# Patient Record
Sex: FEMALE | Race: BLACK OR AFRICAN AMERICAN | Employment: FULL TIME | URBAN - METROPOLITAN AREA
[De-identification: names, ages, dates, MRNs, and addresses within clinical notes are randomized per-mention and may not be internally consistent; named-entity substitution may affect disease eponyms.]

---

## 2017-06-23 ENCOUNTER — OFFICE VISIT (OUTPATIENT)
Dept: INTERNAL MEDICINE CLINIC | Age: 52
End: 2017-06-23

## 2017-06-23 VITALS
OXYGEN SATURATION: 99 % | SYSTOLIC BLOOD PRESSURE: 126 MMHG | BODY MASS INDEX: 38.48 KG/M2 | HEART RATE: 83 BPM | DIASTOLIC BLOOD PRESSURE: 82 MMHG | TEMPERATURE: 98.1 F | HEIGHT: 63 IN | WEIGHT: 217.2 LBS | RESPIRATION RATE: 18 BRPM

## 2017-06-23 DIAGNOSIS — D50.0 IRON DEFICIENCY ANEMIA DUE TO CHRONIC BLOOD LOSS: ICD-10-CM

## 2017-06-23 DIAGNOSIS — E66.09 NON MORBID OBESITY DUE TO EXCESS CALORIES: ICD-10-CM

## 2017-06-23 DIAGNOSIS — Z23 ENCOUNTER FOR IMMUNIZATION: ICD-10-CM

## 2017-06-23 DIAGNOSIS — Z00.00 GENERAL MEDICAL EXAM: Primary | ICD-10-CM

## 2017-06-23 DIAGNOSIS — Z11.59 NEED FOR HEPATITIS C SCREENING TEST: ICD-10-CM

## 2017-06-23 DIAGNOSIS — R79.89 LOW TSH LEVEL: ICD-10-CM

## 2017-06-23 DIAGNOSIS — Z12.11 ENCOUNTER FOR SCREENING COLONOSCOPY: ICD-10-CM

## 2017-06-23 NOTE — PROGRESS NOTES
Chief Complaint   Patient presents with   [facility].     was a pt if [address] back 2013    Physical       1. Have you been to the ER, urgent care clinic since your last visit? Hospitalized since your last visit? No    2. Have you seen or consulted any other health care providers outside of the [address] [place] [address] since your last visit? Include any pap smears or colon screening.  No

## 2017-06-23 NOTE — PROGRESS NOTES
Ms. Laura Baer is a new patient who is here to establish care. CC:  Establish Care (was a pt if DR Matilde Faith back ) and Physical       HPI:   Iron deficient Anemia ( ferritin 7 in ): at the time attributed to fibroids and heavy menstrual bleeding   Stool occult blood negative in    Attributed to heavy menses and fibroids. Patient stopped menstruating last year then had period last 2017. Had 2 pelvic  US in past year done by gyn -[facility] seen by gynecologist in 2017. Has felt intermittent dizziness X2 about 3-4 weeks ago prompting her to make appointment - concerned anemia could be getting worst   Denies blood in the stool    Obesity: patient admits to not eating healthy diet, drinks soda, not exercising    Colonoscopy never  Mammogram last 2016 and normal at [facility]        Review of systems:  Constitutional: negative for fever, chills, weight loss, night sweats   Eyes : negative for vision changes, eye pain and discharge  Nose and Throat: negative for tinnitus, sore throat   Cardiovascular: negative for chest pain, palpitations and shortness of breath  Respiratory: negative for shortness of breath, cough and wheezing   Gastroinstestinal: negative for abdominal pain, nausea, vomiting, diarrhea, constipation, and blood in the stool  Musculoskeletal: negative for back ache and joint ache   Genitourinary: negative for dysuria, nocturia, polyuria and hematuria   Neurologic: Negative for focal weakness, numbness or incoordination  Skin: negative for rash, pruritus  Hematologic: negative for easy bruising      History reviewed. No pertinent past medical history. Past Surgical History:   Procedure Laterality Date    DELIVERY          Allergies   Allergen Reactions    Shellfish Derived Hives       No current outpatient prescriptions on file prior to visit. No current facility-administered medications on file prior to visit. family history includes Cancer in her maternal aunt; Diabetes in her maternal aunt; Hypertension in her maternal aunt and mother. There is no history of Heart Disease or Stroke. Social History     Social History    Marital status:      Spouse name: N/A    Number of children: N/A    Years of education: N/A     Occupational History    Not on file. Social History Main Topics    Smoking status: Former Smoker     Quit date: 1/1/2000    Smokeless tobacco: Never Used    Alcohol use No    Drug use: No    Sexual activity: Yes     Partners: Male     Other Topics Concern    Not on file     Social History Narrative       Visit Vitals    /82 (BP 1 Location: Right arm, BP Patient Position: Sitting)    Pulse 83    Temp 98.1 °F (36.7 °C) (Oral)    Resp 18    Ht 5' 3\" (1.6 m)    Wt 217 lb 3.2 oz (98.5 kg)    SpO2 99%    BMI 38.48 kg/m2     General:  Well appearing female no acute distress  HEENT:   PERRL,normal conjunctiva. External ear and canals normal, TMs normal.  Hearing normal to voice. Nose without edema or discharge, normal septum. Lips, teeth, gums normal.  Oropharynx: no erythema, no exudates, no lesions, normal tongue. Neck:  Supple. Thyroid normal size, nontender, without nodules. No carotid bruit. No masses or lymphadenopathy  Respiratory: no respiratory distress,  no wheezing, no rhonchi, no rales. No chest wall tenderness. Cardiovascular:  RRR, normal S1S2, no murmur. Gastrointestinal: normal bowel sounds, soft, nontender, without masses. No hepatosplenomegaly. Extremities +2 pulses, no edema, normal sensation   Musculoskeletal:  Normal gait. Normal digits and nails. Normal strength and tone, no atrophy, and no abnormal movement. Skin:  No rash, no lesions, no ulcers. Skin warm, normal turgor, without induration or nodules. Neuro:  A and OX4, fluent speech, cranial nerves normal 2-12. Sensation normal to light touch.   DTR symmetrical  Psych:  Normal affect      Lab Results   Component Value Date/Time    WBC 9.4 01/22/2013 12:02 PM    HGB 10.0 01/22/2013 12:02 PM    HCT 30.8 01/22/2013 12:02 PM    PLATELET 615 58/08/5471 12:02 PM    MCV 82 01/22/2013 12:02 PM     Lab Results   Component Value Date/Time    Sodium 136 01/22/2013 12:02 PM    Potassium 4.0 01/22/2013 12:02 PM    Chloride 103 01/22/2013 12:02 PM    CO2 22 01/22/2013 12:02 PM    Glucose 72 01/22/2013 12:02 PM    BUN 13 01/22/2013 12:02 PM    Creatinine 0.90 01/22/2013 12:02 PM    BUN/Creatinine ratio 14 01/22/2013 12:02 PM    GFR est AA >59 03/23/2011 10:04 AM    GFR est non-AA 76 01/22/2013 12:02 PM    Calcium 9.2 01/22/2013 12:02 PM     Lab Results   Component Value Date/Time    Cholesterol, total 168 01/22/2013 12:02 PM    HDL Cholesterol 39 01/22/2013 12:02 PM    LDL, calculated 111 01/22/2013 12:02 PM    VLDL, calculated 18 01/22/2013 12:02 PM    Triglyceride 89 01/22/2013 12:02 PM     Lab Results   Component Value Date/Time    TSH 1.080 01/22/2013 12:02 PM     No results found for: HBA1C, HGBE8, RPY1YXVI, IZP0CPZM, VGJ0RUEP  No results found for: Jeanne Reyes, XQVID3, XQVID, VD3RIA                Assessment and Plan:   48-year-old woman with a history of iron deficient anemia presenting to establish care    1. Iron deficiency anemia due to heavy menses fibroids  -Had only one menstrual cycle in the past year. It was not heavy  -We will check CBC and iron levels  - FERRITIN  - IRON  Pending results and advise whether patient needs to be on iron  2. Encounter for screening colonoscopy    - REFERRAL FOR COLONOSCOPY    3. Non morbid obesity due to excess calories  -Discussed importance of exercising daily given a handout on healthy diet  - METABOLIC PANEL, COMPREHENSIVE  - CBC WITH AUTOMATED DIFF  - HEMOGLOBIN A1C WITH EAG  - TSH AND FREE T4  - LIPID PANEL    4. Need for hepatitis C screening test  - HEPATITIS C AB      5.  Encounter for immunization  - TETANUS, DIPHTHERIA TOXOIDS AND ACELLULAR PERTUSSIS VACCINE (TDAP), IN INDIVIDS. >=7, IM  - [place] IMMUNIZ ADMIN,1 SINGLE/COMB VAC/TOXOID    Follow-up Disposition:  Return in about 6 months (around 12/23/2017).      MD Jean-Pierre

## 2017-06-23 NOTE — MR AVS SNAPSHOT
Visit Information Date & Time Provider Department Dept. Phone Encounter #  
 6/23/2017 11:00 AM Oneil Morejon [address] [place] [address],[address] [phone] [id number] Follow-up Instructions Return in about 6 months (around 12/23/2017). Upcoming Health Maintenance Date Due Hepatitis C Screening 1965 DTaP/Tdap/Td series (1 - Tdap) 7/12/1985 PAP AKA CERVICAL CYTOLOGY 3/1/2013 BREAST CANCER SCRN MAMMOGRAM 7/12/2014 FOBT Q 1 YEAR AGE 50-75 7/12/2014 INFLUENZA AGE 9 TO ADULT 8/1/2017 Allergies as of 6/23/2017  Review Complete On: 6/23/2017 By: Regis Zambrano Severity Noted Reaction Type Reactions Shellfish Derived  06/23/2017    Hives Current Immunizations  Reviewed on 3/17/2011 No immunizations on file. Not reviewed this visit You Were Diagnosed With   
  
 Codes Comments General medical exam    -  Primary ICD-10-CM: Z00.00 ICD-9-CM: V70.9 Encounter for screening colonoscopy     ICD-10-CM: Z12.11 ICD-9-CM: V76.51 Non morbid obesity due to excess calories     ICD-10-CM: E66.09 
ICD-9-CM: 278.00 Need for hepatitis C screening test     ICD-10-CM: Z11.59 
ICD-9-CM: V73.89 Iron deficiency anemia due to chronic blood loss     ICD-10-CM: D50.0 ICD-9-CM: 280.0 Vitals BP Pulse Temp Resp Height(growth percentile) Weight(growth percentile) 126/82 (BP 1 Location: Right arm, BP Patient Position: Sitting) 83 98.1 °F (36.7 °C) (Oral) 18 5' 3\" (1.6 m) 217 lb 3.2 oz (98.5 kg) SpO2 BMI OB Status Smoking Status 99% 38.48 kg/m2 Menopause Former Smoker BMI and BSA Data Body Mass Index Body Surface Area  
 38.48 kg/m 2 2.09 m 2 Preferred Pharmacy Pharmacy Name Phone [id number], [place] [address] [phone] Your Updated Medication List  
  
Notice  As of 6/23/2017 11:17 AM  
 You have not been prescribed any medications. We Performed the Following CBC WITH AUTOMATED DIFF [71409 CPT(R)] FERRITIN [80549 CPT(R)] HEMOGLOBIN A1C WITH EAG [22149 CPT(R)] HEPATITIS C AB [10830 CPT(R)] IRON [id number] CPT(R)] LIPID PANEL [19848 CPT(R)] METABOLIC PANEL, COMPREHENSIVE [06710 CPT(R)] REFERRAL FOR COLONOSCOPY [WJE210 Custom] TSH AND FREE T4 [75283 CPT(R)] Follow-up Instructions Return in about 6 months (around 12/23/2017). Referral Information Referral ID Referred By Referred To  
  
 [id number] APPA [address] [facility], [facility], [address] [place] [address] [address] , [address] Phone: [phone] Fax: [phone] Visits Status Start Date End Date 1 New Request 6/23/17 6/23/18 If your referral has a status of pending review or denied, additional information will be sent to support the outcome of this decision. Patient Instructions Learning About Low-Carbohydrate Diets for Weight Loss What is a low-carbohydrate diet? Low-carb diets avoid foods that are high in carbohydrate. These high-carb foods include pasta, bread, rice, cereal, fruits, and starchy vegetables. Instead, these diets usually have you eat foods that are high in fat and protein. Many people lose weight quickly on a low-carb diet. But the early weight loss is water. People on this diet often gain the weight back after they start eating carbs again. Not all diet plans are safe or work well. A lot of the evidence shows that low-carb diets aren't healthy. That's because these diets often don't include healthy foods like fruits and vegetables. Losing weight safely means balancing protein, fat, and carbs with every meal and snack. And low-carb diets don't always provide the vitamins, minerals, and fiber you need.  
If you have a serious medical condition, talk to your doctor before you try any diet. These conditions include kidney disease, heart disease, type 2 diabetes, high cholesterol, and high blood pressure. If you are pregnant, it may not be safe for your baby if you are on a low-carb diet. How can you lose weight safely? You might have heard that a diet plan helped another person lose weight. But that doesn't mean that it will work for you. It is very hard to stay on a diet that includes lots of big changes in your eating habits. If you want to get to a healthy weight and stay there, making healthy lifestyle changes will often work better than dieting. These steps can help. · Make a plan for change. Work with your doctor to create a plan that is right for you. · See a dietitian. He or she can show you how to make healthy changes in your eating habits. · Manage stress. If you have a lot of stress in your life, it can be hard to focus on making healthy changes to your daily habits. · Track your food and activity. You are likely to do better at losing weight if you keep track of what you eat and what you do. Follow-up care is a key part of your treatment and safety. Be sure to make and go to all appointments, and call your doctor if you are having problems. It's also a good idea to know your test results and keep a list of the medicines you take. Where can you learn more? Go to http://scott-chris.info/. Enter A121 in the search box to learn more about \"Learning About Low-Carbohydrate Diets for Weight Loss. \" Current as of: December 8, 2016 Content Version: 11.3 © 5320-2757 Healthwise, Incorporated. Care instructions adapted under license by [employer] (which disclaims liability or warranty for this information). If you have questions about a medical condition or this instruction, always ask your healthcare professional. [place] [address] any warranty or liability for your use of this information. Walk 20-30 minutes Introducing [facility] & HEALTH SERVICES! [employer] introduces [employer] patient portal. Now you can access parts of your medical record, email your doctor's office, and request medication refills online. 1. In your internet browser, go to [email or web address]. 2. Click on the First Time User? Click Here link in the Sign In box. You will see the New Member Sign Up page. 3. Enter your [employer] Access Code exactly as it appears below. You will not need to use this code after youve completed the sign-up process. If you do not sign up before the expiration date, you must request a new code. · [employer] Access Code: [email or web address] Expires: 9/21/2017 11:17 AM 
 
4. Enter the last four digits of your Social Security Number (xxxx) and Date of Birth (mm/dd/yyyy) as indicated and click Submit. You will be taken to the next sign-up page. 5. Create a [employer] ID. This will be your [employer] login ID and cannot be changed, so think of one that is secure and easy to remember. 6. Create a [employer] password. You can change your password at any time. 7. Enter your Password Reset Question and Answer. This can be used at a later time if you forget your password. 8. Enter your e-mail address. You will receive e-mail notification when new information is available in [address] 9. Click Sign Up. You can now view and download portions of your medical record. 10. Click the Download Summary menu link to download a portable copy of your medical information. If you have questions, please visit the Frequently Asked Questions section of the [employer] website. Remember, [employer] is NOT to be used for urgent needs. For medical emergencies, dial 911. Now available from your iPhone and Android! Please provide this summary of care documentation to your next provider. Your primary care clinician is listed as Janet TORRES. If you have any questions after today's visit, please call [phone].

## 2017-06-24 LAB
ALBUMIN SERPL-MCNC: 4.3 G/DL (ref 3.5–5.5)
ALBUMIN/GLOB SERPL: 1.5 {RATIO} (ref 1.2–2.2)
ALP SERPL-CCNC: 73 IU/L (ref 39–117)
ALT SERPL-CCNC: 16 IU/L (ref 0–32)
AST SERPL-CCNC: 16 IU/L (ref 0–40)
BASOPHILS # BLD AUTO: 0 X10E3/UL (ref 0–0.2)
BASOPHILS NFR BLD AUTO: 1 %
BILIRUB SERPL-MCNC: 0.3 MG/DL (ref 0–1.2)
BUN SERPL-MCNC: 17 MG/DL (ref 6–24)
BUN/CREAT SERPL: 19 (ref 9–23)
CALCIUM SERPL-MCNC: 9.9 MG/DL (ref 8.7–10.2)
CHLORIDE SERPL-SCNC: 105 MMOL/L (ref 96–106)
CHOLEST SERPL-MCNC: 172 MG/DL (ref 100–199)
CO2 SERPL-SCNC: 24 MMOL/L (ref 18–29)
CREAT SERPL-MCNC: 0.9 MG/DL (ref 0.57–1)
EOSINOPHIL # BLD AUTO: 0.1 X10E3/UL (ref 0–0.4)
EOSINOPHIL NFR BLD AUTO: 2 %
ERYTHROCYTE [DISTWIDTH] IN BLOOD BY AUTOMATED COUNT: 13.5 % (ref 12.3–15.4)
EST. AVERAGE GLUCOSE BLD GHB EST-MCNC: 117 MG/DL
FERRITIN SERPL-MCNC: 82 NG/ML (ref 15–150)
GLOBULIN SER CALC-MCNC: 2.9 G/DL (ref 1.5–4.5)
GLUCOSE SERPL-MCNC: 72 MG/DL (ref 65–99)
HBA1C MFR BLD: 5.7 % (ref 4.8–5.6)
HCT VFR BLD AUTO: 37.6 % (ref 34–46.6)
HCV AB S/CO SERPL IA: <0.1 S/CO RATIO (ref 0–0.9)
HDLC SERPL-MCNC: 35 MG/DL
HGB BLD-MCNC: 12.7 G/DL (ref 11.1–15.9)
IMM GRANULOCYTES # BLD: 0 X10E3/UL (ref 0–0.1)
IMM GRANULOCYTES NFR BLD: 0 %
IRON SERPL-MCNC: 68 UG/DL (ref 27–159)
LDLC SERPL CALC-MCNC: 123 MG/DL (ref 0–99)
LYMPHOCYTES # BLD AUTO: 2.7 X10E3/UL (ref 0.7–3.1)
LYMPHOCYTES NFR BLD AUTO: 44 %
MCH RBC QN AUTO: 30.2 PG (ref 26.6–33)
MCHC RBC AUTO-ENTMCNC: 33.8 G/DL (ref 31.5–35.7)
MCV RBC AUTO: 89 FL (ref 79–97)
MONOCYTES # BLD AUTO: 0.5 X10E3/UL (ref 0.1–0.9)
MONOCYTES NFR BLD AUTO: 8 %
NEUTROPHILS # BLD AUTO: 2.8 X10E3/UL (ref 1.4–7)
NEUTROPHILS NFR BLD AUTO: 45 %
PLATELET # BLD AUTO: 271 X10E3/UL (ref 150–379)
POTASSIUM SERPL-SCNC: 4.7 MMOL/L (ref 3.5–5.2)
PROT SERPL-MCNC: 7.2 G/DL (ref 6–8.5)
RBC # BLD AUTO: 4.21 X10E6/UL (ref 3.77–5.28)
SODIUM SERPL-SCNC: 143 MMOL/L (ref 134–144)
T4 FREE SERPL-MCNC: 1.03 NG/DL (ref 0.82–1.77)
TRIGL SERPL-MCNC: 71 MG/DL (ref 0–149)
TSH SERPL DL<=0.005 MIU/L-ACNC: 0.44 UIU/ML (ref 0.45–4.5)
VLDLC SERPL CALC-MCNC: 14 MG/DL (ref 5–40)
WBC # BLD AUTO: 6.2 X10E3/UL (ref 3.4–10.8)

## 2017-06-27 NOTE — PROGRESS NOTES
Kidney and liver function are normal  Blood count is normal and irons levels are normal - no anemia. 1. Screening for diabetes: shows that you in the pre diabetic range with you sugars. It is important to increase exercise to 30 minutes daily and decrease the amount of carbohydrates ( sugars, bread, pasta, rice, potato) to ensure that you do not develop diabetes. We will repeat this in 3-6 months and if still elevated we can consider starting medication to prevent diabetes - Metformin  Hep C is negative  Thyroid function - TSH is slightly low but very close to normal range - I have a ordered a repeat Thyroid study to be done at your convenience  4. Triglycerides ( short term fat storage) is normal. HDL good cholesterol is low ( 35 goal is above 40)  LDL which is bad cholesterol is mildly elevated recommend increased exercise to 30 minutes daily and increased fiber intake - vegetables, fruits and oats and whole grain. Decrease fatty food intake. We will repeat choletserol level in one year.

## 2018-08-09 ENCOUNTER — OFFICE VISIT (OUTPATIENT)
Dept: INTERNAL MEDICINE CLINIC | Age: 53
End: 2018-08-09

## 2018-08-09 VITALS
HEIGHT: 63 IN | WEIGHT: 212 LBS | TEMPERATURE: 97.8 F | OXYGEN SATURATION: 99 % | BODY MASS INDEX: 37.56 KG/M2 | DIASTOLIC BLOOD PRESSURE: 82 MMHG | RESPIRATION RATE: 18 BRPM | HEART RATE: 69 BPM | SYSTOLIC BLOOD PRESSURE: 119 MMHG

## 2018-08-09 DIAGNOSIS — Z12.39 SCREENING FOR BREAST CANCER: ICD-10-CM

## 2018-08-09 DIAGNOSIS — E78.00 ELEVATED CHOLESTEROL: ICD-10-CM

## 2018-08-09 DIAGNOSIS — Z12.11 SCREENING FOR COLON CANCER: ICD-10-CM

## 2018-08-09 DIAGNOSIS — Z00.00 ANNUAL PHYSICAL EXAM: Primary | ICD-10-CM

## 2018-08-09 DIAGNOSIS — Z86.2 HX OF IRON DEFICIENCY ANEMIA: ICD-10-CM

## 2018-08-09 DIAGNOSIS — R79.89 ABNORMAL TSH: ICD-10-CM

## 2018-08-09 PROBLEM — E66.01 SEVERE OBESITY (BMI 35.0-39.9): Status: ACTIVE | Noted: 2018-08-09

## 2018-08-09 NOTE — PROGRESS NOTES
Reviewed record in preparation for visit and have obtained necessary documentation. Identified pt with two pt identifiers(name and ). Chief Complaint   Patient presents with    Complete Physical       Health Maintenance Due   Topic Date Due    Cervical Cancer Screening  2013    Stool testing for trace blood  2014    Breast Cancer Screening  10/03/2017    Flu Vaccine  2018       Ms. Hollie Julio has a reminder for a \"due or due soon\" health maintenance. I have asked that she discuss this further with her primary care provider for follow-up on this health maintenance. Coordination of Care Questionnaire:  :     1) Have you been to an emergency room, urgent care clinic since your last visit? no   Hospitalized since your last visit? no             2) Have you seen or consulted any other health care providers outside of [address] [place] [address] since your last visit? no  (Include any pap smears or colon screenings in this section.)    3) In the event something were to happen to you and you were unable to speak on your behalf, do you have an Advance Directive/ Living Will in place stating your wishes? YES    Do you have an Advance Directive on file? yes    4) Are you interested in receiving information on Advance Directives? NO    Patient is accompanied by self I have received verbal consent from Danielle Barker to discuss any/all medical information while they are present in the room.

## 2018-08-09 NOTE — MR AVS SNAPSHOT
[address] [facility] 
[phone] Patient: Nyasia Jacome MRN: [id number] :1965 Visit Information Date & Time Provider Department Dept. Phone Encounter #  
 2018  8:30 AM Jose Alberto Srinivasan, [address] [place] [address],[address] [phone] [id number] Follow-up Instructions Return in about 1 year (around 2019) for annual exam. Upcoming Health Maintenance Date Due COLONOSCOPY 1982 PAP AKA CERVICAL CYTOLOGY 3/1/2013 BREAST CANCER SCRN MAMMOGRAM 10/3/2017 Influenza Age 5 to Adult 2018 DTaP/Tdap/Td series (2 - Td) 2027 Allergies as of 2018  Review Complete On: 2018 By: Jose Alberto Srinivasan MD  
  
 Severity Noted Reaction Type Reactions Shellfish Derived  2017    Hives Current Immunizations  Reviewed on 3/17/2011 Name Date Tdap 2017 11:31 AM  
  
 Not reviewed this visit You Were Diagnosed With   
  
 Codes Comments Annual physical exam    -  Primary ICD-10-CM: Z00.00 ICD-9-CM: V70.0 Screening for colon cancer     ICD-10-CM: Z12.11 ICD-9-CM: V76.51 Elevated cholesterol     ICD-10-CM: E78.00 ICD-9-CM: 272.0 Abnormal TSH     ICD-10-CM: R94.6 ICD-9-CM: 790.6 Hx of iron deficiency anemia     ICD-10-CM: Z86.2 ICD-9-CM: V12.3 Screening for breast cancer     ICD-10-CM: Z12.31 
ICD-9-CM: V76.10 Vitals BP Pulse Temp Resp Height(growth percentile) Weight(growth percentile) 119/82 (BP 1 Location: Right arm, BP Patient Position: Sitting) 69 97.8 °F (36.6 °C) (Oral) 18 5' 3\" (1.6 m) 212 lb (96.2 kg) SpO2 BMI OB Status Smoking Status 99% 37.55 kg/m2 Menopause Former Smoker BMI and BSA Data Body Mass Index Body Surface Area  
 37.55 kg/m 2 2.07 m 2 Preferred Pharmacy Pharmacy Name Phone  Evette Parrish [address] [phone] Your Updated Medication List  
  
Notice  As of 8/9/2018  8:58 AM  
 You have not been prescribed any medications. We Performed the Following CBC WITH AUTOMATED DIFF [22606 CPT(R)] LIPID PANEL [17727 CPT(R)] METABOLIC PANEL, COMPREHENSIVE [36343 CPT(R)] REFERRAL FOR COLONOSCOPY [TOY463 Custom] Comments:  
 Screening colonoscopy TSH AND FREE T4 [93223 CPT(R)] Follow-up Instructions Return in about 1 year (around 8/9/2019) for annual exam. To-Do List   
 08/09/2018 Imaging:  [facility] MAMMO BI SCREENING INCL CAD Referral Information Referral ID Referred By Referred To  
  
 [id number] RENEE Vincent MD   
   [address] [place] [address] [address] [facility] [address] Phone: [phone] Fax: [phone] Visits Status Start Date End Date 1 New Request 8/9/18 8/9/19 If your referral has a status of pending review or denied, additional information will be sent to support the outcome of this decision. Patient Instructions Schedule mammogram 
 
Schedule colonoscopy Continue working on weight loss - goal is to loose 1 lb per month Introducing [facility] & HE[facility] SERVICES! Dear Lady Kirkland: Thank you for requesting a [employer] account. Our records indicate that you already have an active [employer] account. You can access your account anytime at [email or web address]. [email or web address] Did you know that you can access your hospital and ER discharge instructions at any time in [employer]? You can also review all of your test results from your hospital stay or ER visit. Additional Information If you have questions, please visit the Frequently Asked Questions section of the [employer] website at [email or web address] Remember, [employer] is NOT to be used for urgent needs. For medical emergencies, dial 911. Now available from your iPhone and Android! Please provide this summary of care documentation to your next provider. Your primary care clinician is listed as MIKE Mazariegos. If you have any questions after today's visit, please call [phone].

## 2018-08-09 NOTE — PATIENT INSTRUCTIONS
Schedule mammogram    Schedule colonoscopy    Continue working on weight loss - goal is to loose 1 lb per month

## 2018-08-09 NOTE — PROGRESS NOTES
Ms. Manish Brown is presenting to follow up on chronic medical issues   CC:  Complete Physical  Obesity  High cholesterol  Hx of iron deficient anemia      HPI:   Iron deficient Anemia ( ferritin 7 in ): at the time attributed to fibroids and heavy menstrual bleeding   Stool occult blood negative in    Attributed to heavy menses and fibroids. Patient stopped menstruating in  and the repeat CBC last year was normal   Had 2 pelvic  US in past year done by gyn -[address] [facility] [address] seen by gynecologist in 2017. Obesity:lost 5 lbs since last year    Colonoscopy never - given referral last year and not done    Mammogram last 2016 and normal at [facility] [place]        Review of systems:  10 systems reviewed and negative other than HPI      History reviewed. No pertinent past medical history. Past Surgical History:   Procedure Laterality Date    DELIVERY          Allergies   Allergen Reactions    Shellfish Derived Hives       No current outpatient prescriptions on file prior to visit. No current facility-administered medications on file prior to visit. family history includes Cancer in her maternal aunt; Diabetes in her maternal aunt; Hypertension in her maternal aunt and mother. There is no history of Heart Disease or Stroke. Social History     Social History    Marital status:      Spouse name: N/A    Number of children: N/A    Years of education: N/A     Occupational History    Not on file.      Social History Main Topics    Smoking status: Former Smoker     Quit date: 2000    Smokeless tobacco: Never Used    Alcohol use No    Drug use: No    Sexual activity: Yes     Partners: Male     Other Topics Concern    Not on file     Social History Narrative       Visit Vitals    /82 (BP 1 Location: Right arm, BP Patient Position: Sitting)    Pulse 69    Temp 97.8 °F (36.6 °C) (Oral)    Resp 18    Ht 5' 3\" (1.6 m)    Wt 212 lb (96.2 kg)    SpO2 99%    BMI 37.55 kg/m2     General:  Well appearing female no acute distress  HEENT:   PERRL,normal conjunctiva. External ear and canals normal, TMs normal.  Hearing normal to voice. Nose without edema or discharge, normal septum. Lips, teeth, gums normal.  Oropharynx: no erythema, no exudates, no lesions, normal tongue. Neck:  Supple. Thyroid normal size, nontender, without nodules. No carotid bruit. No masses or lymphadenopathy  Respiratory: no respiratory distress,  no wheezing, no rhonchi, no rales. No chest wall tenderness. Cardiovascular:  RRR, normal S1S2, no murmur. Gastrointestinal: normal bowel sounds, soft, nontender, without masses. No hepatosplenomegaly. Extremities +2 pulses, no edema, normal sensation   Musculoskeletal:  Normal gait. Normal digits and nails. Normal strength and tone, no atrophy, and no abnormal movement. Skin:  No rash, no lesions, no ulcers. Skin warm, normal turgor, without induration or nodules. Neuro:  A and OX4, fluent speech, cranial nerves normal 2-12. Sensation normal to light touch.   DTR symmetrical  Psych:  Normal affect      Lab Results   Component Value Date/Time    WBC 6.2 06/23/2017 11:47 AM    HGB 12.7 06/23/2017 11:47 AM    HCT 37.6 06/23/2017 11:47 AM    PLATELET 164 81/46/4224 11:47 AM    MCV 89 06/23/2017 11:47 AM     Lab Results   Component Value Date/Time    Sodium 143 06/23/2017 11:47 AM    Potassium 4.7 06/23/2017 11:47 AM    Chloride 105 06/23/2017 11:47 AM    CO2 24 06/23/2017 11:47 AM    Glucose 72 06/23/2017 11:47 AM    BUN 17 06/23/2017 11:47 AM    Creatinine 0.90 06/23/2017 11:47 AM    BUN/Creatinine ratio 19 06/23/2017 11:47 AM    GFR est AA 85 06/23/2017 11:47 AM    GFR est non-AA 74 06/23/2017 11:47 AM    Calcium 9.9 06/23/2017 11:47 AM     Lab Results   Component Value Date/Time    Cholesterol, total 172 06/23/2017 11:47 AM    HDL Cholesterol 35 (L) 06/23/2017 11:47 AM    LDL, calculated 123 (H) 06/23/2017 11:47 AM    VLDL, calculated 14 06/23/2017 11:47 AM    Triglyceride 71 06/23/2017 11:47 AM     Lab Results   Component Value Date/Time    TSH 0.442 (L) 06/23/2017 11:47 AM     Lab Results   Component Value Date/Time    Hemoglobin A1c 5.7 (H) 06/23/2017 11:47 AM     No results found for: Orvis Cancel, XQVID3, XQVID, VD3RIA                Assessment and Plan:   50-year-old woman with a history of iron deficient anemia  Presenting to follow up     Annual physical exam: normal exam    Slightly abnormal TSH last year - repeat TSH this year, asymptomatic    Iron deficiency anemia due to heavy menses fibroids  - since menopause resolved  - check CBC today      Encounter for screening colonoscopy  - REFERRAL FOR COLONOSCOPY     Non morbid obesity due to excess calories  -patient lost 5 lbs in past year   -encouraged to continue working on weight loss goal is 1-2 lbs per month       Ordered screening mammogram  Follow-up Disposition: Not on File     Corinna Schwab MD

## 2018-08-10 LAB
ALBUMIN SERPL-MCNC: 4.4 G/DL (ref 3.5–5.5)
ALBUMIN/GLOB SERPL: 1.6 {RATIO} (ref 1.2–2.2)
ALP SERPL-CCNC: 76 IU/L (ref 39–117)
ALT SERPL-CCNC: 21 IU/L (ref 0–32)
AST SERPL-CCNC: 21 IU/L (ref 0–40)
BASOPHILS # BLD AUTO: 0 X10E3/UL (ref 0–0.2)
BASOPHILS NFR BLD AUTO: 1 %
BILIRUB SERPL-MCNC: 0.3 MG/DL (ref 0–1.2)
BUN SERPL-MCNC: 18 MG/DL (ref 6–24)
BUN/CREAT SERPL: 20 (ref 9–23)
CALCIUM SERPL-MCNC: 9.9 MG/DL (ref 8.7–10.2)
CHLORIDE SERPL-SCNC: 103 MMOL/L (ref 96–106)
CHOLEST SERPL-MCNC: 176 MG/DL (ref 100–199)
CO2 SERPL-SCNC: 23 MMOL/L (ref 20–29)
CREAT SERPL-MCNC: 0.92 MG/DL (ref 0.57–1)
EOSINOPHIL # BLD AUTO: 0.1 X10E3/UL (ref 0–0.4)
EOSINOPHIL NFR BLD AUTO: 1 %
ERYTHROCYTE [DISTWIDTH] IN BLOOD BY AUTOMATED COUNT: 13.3 % (ref 12.3–15.4)
GLOBULIN SER CALC-MCNC: 2.8 G/DL (ref 1.5–4.5)
GLUCOSE SERPL-MCNC: 73 MG/DL (ref 65–99)
HCT VFR BLD AUTO: 38.4 % (ref 34–46.6)
HDLC SERPL-MCNC: 42 MG/DL
HGB BLD-MCNC: 12.6 G/DL (ref 11.1–15.9)
IMM GRANULOCYTES # BLD: 0 X10E3/UL (ref 0–0.1)
IMM GRANULOCYTES NFR BLD: 0 %
LDLC SERPL CALC-MCNC: 124 MG/DL (ref 0–99)
LYMPHOCYTES # BLD AUTO: 3 X10E3/UL (ref 0.7–3.1)
LYMPHOCYTES NFR BLD AUTO: 44 %
MCH RBC QN AUTO: 29.5 PG (ref 26.6–33)
MCHC RBC AUTO-ENTMCNC: 32.8 G/DL (ref 31.5–35.7)
MCV RBC AUTO: 90 FL (ref 79–97)
MONOCYTES # BLD AUTO: 0.5 X10E3/UL (ref 0.1–0.9)
MONOCYTES NFR BLD AUTO: 8 %
NEUTROPHILS # BLD AUTO: 3.2 X10E3/UL (ref 1.4–7)
NEUTROPHILS NFR BLD AUTO: 46 %
PLATELET # BLD AUTO: 258 X10E3/UL (ref 150–379)
POTASSIUM SERPL-SCNC: 4.3 MMOL/L (ref 3.5–5.2)
PROT SERPL-MCNC: 7.2 G/DL (ref 6–8.5)
RBC # BLD AUTO: 4.27 X10E6/UL (ref 3.77–5.28)
SODIUM SERPL-SCNC: 141 MMOL/L (ref 134–144)
T4 FREE SERPL-MCNC: 0.95 NG/DL (ref 0.82–1.77)
TRIGL SERPL-MCNC: 50 MG/DL (ref 0–149)
TSH SERPL DL<=0.005 MIU/L-ACNC: 0.97 UIU/ML (ref 0.45–4.5)
VLDLC SERPL CALC-MCNC: 10 MG/DL (ref 5–40)
WBC # BLD AUTO: 6.9 X10E3/UL (ref 3.4–10.8)

## 2018-08-14 NOTE — PROGRESS NOTES
Normal blood count  Thyroid function normalized   Cholesterol: Triglycerides are normal( short term fat storage), HDL good cholesterol is at goal,  LDL which is bad cholesterol is mildly elevated. Recommend increasing  exercise to 30 minutes daily and increased fiber intake - vegetables, fruits and oats and whole grain. Decreasing fatty food intake. We will repeat cholesterol level in one year.

## 2018-12-27 ENCOUNTER — TELEPHONE (OUTPATIENT)
Dept: INTERNAL MEDICINE CLINIC | Age: 53
End: 2018-12-27

## 2018-12-27 RX ORDER — BENZONATATE 200 MG/1
200 CAPSULE ORAL
Qty: 16 CAP | Refills: 0 | Status: SHIPPED | OUTPATIENT
Start: 2018-12-27 | End: 2019-01-03

## 2018-12-27 NOTE — TELEPHONE ENCOUNTER
MD Anna Marie Monique LPN   Caller: Unspecified (Today,  8:24 AM)             Sounds like a viral infection, given runny nose - takes 1-2 weeks to run its course. Prescribed tessalon pearls to help with cough      Spoke with patient. Two pt identifiers confirmed. Patient advised of the above message from Dr. Akilah Garcia. Pt verbalized understanding of information discussed w/ no further questions at this time.

## 2018-12-27 NOTE — TELEPHONE ENCOUNTER
Spoke with patient. Two pt identifiers confirmed. Patient states that she is coughing and congestion. Patient states that her cough is productive with green phelgm. Patient states that she has a runny nose. Patient states that she has also been achey, but does not have a thermometer so she has not checked her temperature. Patient states that she has been taking OTC mucinex, but is not getting any relief. Advised patient that I will check with Dr. Yosvany Faust to see if she can send something in to her pharmacy or if the patient will need to be seen and I will call her back. Pt verbalized understanding of information discussed w/ no further questions at this time.

## 2018-12-27 NOTE — TELEPHONE ENCOUNTER
Patient is requesting to be seen today, I spoke to [facility] and she told me to send over this request. Symptoms are listed in the Reason box.  Thanks

## 2019-08-09 ENCOUNTER — OFFICE VISIT (OUTPATIENT)
Dept: INTERNAL MEDICINE CLINIC | Age: 54
End: 2019-08-09

## 2019-08-09 VITALS
WEIGHT: 226 LBS | SYSTOLIC BLOOD PRESSURE: 122 MMHG | BODY MASS INDEX: 40.04 KG/M2 | RESPIRATION RATE: 18 BRPM | OXYGEN SATURATION: 99 % | HEART RATE: 77 BPM | DIASTOLIC BLOOD PRESSURE: 82 MMHG | TEMPERATURE: 98.1 F | HEIGHT: 63 IN

## 2019-08-09 DIAGNOSIS — E78.00 HIGH CHOLESTEROL: ICD-10-CM

## 2019-08-09 DIAGNOSIS — R29.818 SUSPECTED SLEEP APNEA: ICD-10-CM

## 2019-08-09 DIAGNOSIS — Z00.00 ANNUAL PHYSICAL EXAM: Primary | ICD-10-CM

## 2019-08-09 DIAGNOSIS — E66.01 CLASS 3 SEVERE OBESITY DUE TO EXCESS CALORIES WITH SERIOUS COMORBIDITY AND BODY MASS INDEX (BMI) OF 40.0 TO 44.9 IN ADULT (HCC): ICD-10-CM

## 2019-08-09 DIAGNOSIS — Z13.1 SCREENING FOR DIABETES MELLITUS: ICD-10-CM

## 2019-08-09 NOTE — PROGRESS NOTES
Reviewed record in preparation for visit and have obtained necessary documentation. Identified pt with two pt identifiers(name and ). Chief Complaint   Patient presents with    Complete Physical    Sleep Problem       Health Maintenance Due   Topic Date Due    Pap Test  2013    Shingles Vaccine (1 of 2) 2014    Mammogram  10/03/2017    Flu Vaccine  2019       Ms. Noni Torre has a reminder for a \"due or due soon\" health maintenance. I have asked that she discuss this further with her primary care provider for follow-up on this health maintenance. Coordination of Care Questionnaire:  :     1) Have you been to an emergency room, urgent care clinic since your last visit? no   Hospitalized since your last visit? no             2) Have you seen or consulted any other health care providers outside of [address] [place] [address] since your last visit? no  (Include any pap smears or colon screenings in this section.)    3) In the event something were to happen to you and you were unable to speak on your behalf, do you have an Advance Directive/ Living Will in place stating your wishes? NO    Do you have an Advance Directive on file? no    4) Are you interested in receiving information on Advance Directives? NO    Patient is accompanied by self I have received verbal consent from Mitch Hurt to discuss any/all medical information while they are present in the room.

## 2019-08-09 NOTE — PROGRESS NOTES
Ms. Rajendra Botello is presenting to follow up on chronic medical issues   CC:  Complete Physical and Sleep Problem  Obesity  High cholesterol  Hx of iron deficient anemia      HPI:  Ms Adolfo Keys is presenting for annual visit    Obesity:she gained 14 lbs since last visit, struggling with eating fast food and sodas. Discussed importance of weight loss     Colonoscopy done in the interval and normal    Mammogram last 2016 and normal at [facility]    Feels fatigued, snoring, non restorative sleep. Concerned with possible sleep apnea    Review of systems:  10 systems reviewed and negative other than HPI      History reviewed. No pertinent past medical history. Past Surgical History:   Procedure Laterality Date    DELIVERY          Allergies   Allergen Reactions    Shellfish Derived Hives       No current outpatient medications on file prior to visit. No current facility-administered medications on file prior to visit. family history includes Cancer in her maternal aunt; Diabetes in her maternal aunt; Hypertension in her maternal aunt and mother.     Social History     Socioeconomic History    Marital status:      Spouse name: Not on file    Number of children: Not on file    Years of education: Not on file    Highest education level: Not on file   Occupational History    Not on file   Social Needs    Financial resource strain: Not on file    Food insecurity:     Worry: Not on file     Inability: Not on file    Transportation needs:     Medical: Not on file     Non-medical: Not on file   Tobacco Use    Smoking status: Former Smoker     Last attempt to quit: 2000     Years since quittin.6    Smokeless tobacco: Never Used   Substance and Sexual Activity    Alcohol use: No    Drug use: No    Sexual activity: Yes     Partners: Male   Lifestyle    Physical activity:     Days per week: Not on file     Minutes per session: Not on file    Stress: Not on file   Relationships    Social connections:     Talks on phone: Not on file     Gets together: Not on file     Attends [employer] service: Not on file     Active member of club or organization: Not on file     Attends meetings of clubs or organizations: Not on file     Relationship status: Not on file    Intimate partner violence:     Fear of current or ex partner: Not on file     Emotionally abused: Not on file     Physically abused: Not on file     Forced sexual activity: Not on file   Other Topics Concern    Not on file   Social History Narrative    Not on file       Visit Vitals  /82 (BP 1 Location: Right arm, BP Patient Position: Sitting)   Pulse 77   Temp 98.1 °F (36.7 °C) (Oral)   Resp 18   Ht 5' 3\" (1.6 m)   Wt 226 lb (102.5 kg)   SpO2 99%   BMI 40.03 kg/m²     General:  Well appearing female no acute distress  HEENT:   PERRL,normal conjunctiva. External ear and canals normal, TMs normal.  Hearing normal to voice. Nose without edema or discharge, normal septum. Lips, teeth, gums normal.  Oropharynx: no erythema, no exudates, no lesions, normal tongue. Neck:  Supple. Thyroid normal size, nontender, without nodules. No carotid bruit. No masses or lymphadenopathy  Respiratory: no respiratory distress,  no wheezing, no rhonchi, no rales. No chest wall tenderness. Cardiovascular:  RRR, normal S1S2, no murmur. Gastrointestinal: normal bowel sounds, soft, nontender, without masses. No hepatosplenomegaly. Extremities +2 pulses, no edema, normal sensation   Musculoskeletal:  Normal gait. Normal digits and nails. Normal strength and tone, no atrophy, and no abnormal movement. Skin:  No rash, no lesions, no ulcers. Skin warm, normal turgor, without induration or nodules. Neuro:  A and OX4, fluent speech, cranial nerves normal 2-12. Sensation normal to light touch.   DTR symmetrical  Psych:  Normal affect      Lab Results   Component Value Date/Time    WBC 6.9 08/09/2018 09:05 AM    HGB 12.6 08/09/2018 09:05 AM    HCT 38.4 08/09/2018 09:05 AM    PLATELET 971 69/45/8595 09:05 AM    MCV 90 08/09/2018 09:05 AM     Lab Results   Component Value Date/Time    Sodium 141 08/09/2018 09:05 AM    Potassium 4.3 08/09/2018 09:05 AM    Chloride 103 08/09/2018 09:05 AM    CO2 23 08/09/2018 09:05 AM    Glucose 73 08/09/2018 09:05 AM    BUN 18 08/09/2018 09:05 AM    Creatinine 0.92 08/09/2018 09:05 AM    BUN/Creatinine ratio 20 08/09/2018 09:05 AM    GFR est AA 82 08/09/2018 09:05 AM    GFR est non-AA 71 08/09/2018 09:05 AM    Calcium 9.9 08/09/2018 09:05 AM     Lab Results   Component Value Date/Time    Cholesterol, total 176 08/09/2018 09:05 AM    HDL Cholesterol 42 08/09/2018 09:05 AM    LDL, calculated 124 (H) 08/09/2018 09:05 AM    VLDL, calculated 10 08/09/2018 09:05 AM    Triglyceride 50 08/09/2018 09:05 AM     Lab Results   Component Value Date/Time    TSH 0.973 08/09/2018 09:05 AM     Lab Results   Component Value Date/Time    Hemoglobin A1c 5.7 (H) 06/23/2017 11:47 AM     No results found for: Marj Ferrell, XQVID3, XQVID, VD3RIA                Assessment and Plan:   62-year-old woman with a history of iron deficient anemia  Presenting to follow up     Annual physical exam: normal exam    Iron deficiency anemia due to heavy menses fibroids  - since menopause resolved  -     Given prescription for annual mammogram     Non morbid obesity due to excess calories  -patient gained 14 lbs   -we discussed the importance of weight loss and healthy eating    Suspected MARIANA: referral to sleep medicine     Ordered screening mammogram       Shweta Albright MD

## 2019-08-09 NOTE — PATIENT INSTRUCTIONS
Schedule pap smear Schedule your mammogram 
Schedule appointment with the nutritionist 
Schedule appointment with sleep doctor Office Policies Phone calls/patient messages: Please allow up to 24 hours for someone in the office to contact you about your call or message. Be mindful your provider may be out of the office or your message may require further review. We encourage you to use [employer] for your messages as this is a faster, more efficient way to communicate with our office Medication Refills: 
         
Prescription medications require 48-72 business hours to process. We encourage you to use [employer] for your refills. For controlled medications: Please allow 72 business hours to process. Certain medications may require you to  a written prescription at our office. NO narcotic/controlled medications will be prescribed after 4pm Monday through Friday or on weekends Form/Paperwork Completion: 
         
Please note a $25 fee may incur for all paperwork for completed by our providers. We ask that you allow 7-10 business days. Pre-payment is due prior to picking up/faxing the completed form. You may also download your forms to [employer] to have your doctor print off.

## 2019-08-10 LAB
ALBUMIN SERPL-MCNC: 4.3 G/DL (ref 3.5–5.5)
ALBUMIN/GLOB SERPL: 1.4 {RATIO} (ref 1.2–2.2)
ALP SERPL-CCNC: 73 IU/L (ref 39–117)
ALT SERPL-CCNC: 13 IU/L (ref 0–32)
AST SERPL-CCNC: 14 IU/L (ref 0–40)
BASOPHILS # BLD AUTO: 0 X10E3/UL (ref 0–0.2)
BASOPHILS NFR BLD AUTO: 1 %
BILIRUB SERPL-MCNC: 0.3 MG/DL (ref 0–1.2)
BUN SERPL-MCNC: 17 MG/DL (ref 6–24)
BUN/CREAT SERPL: 16 (ref 9–23)
CALCIUM SERPL-MCNC: 10.1 MG/DL (ref 8.7–10.2)
CHLORIDE SERPL-SCNC: 106 MMOL/L (ref 96–106)
CHOLEST SERPL-MCNC: 196 MG/DL (ref 100–199)
CO2 SERPL-SCNC: 24 MMOL/L (ref 20–29)
CREAT SERPL-MCNC: 1.09 MG/DL (ref 0.57–1)
EOSINOPHIL # BLD AUTO: 0.1 X10E3/UL (ref 0–0.4)
EOSINOPHIL NFR BLD AUTO: 1 %
ERYTHROCYTE [DISTWIDTH] IN BLOOD BY AUTOMATED COUNT: 12.7 % (ref 12.3–15.4)
GLOBULIN SER CALC-MCNC: 3 G/DL (ref 1.5–4.5)
GLUCOSE SERPL-MCNC: 84 MG/DL (ref 65–99)
HBA1C MFR BLD: 5.6 % (ref 4.8–5.6)
HCT VFR BLD AUTO: 37.9 % (ref 34–46.6)
HDLC SERPL-MCNC: 34 MG/DL
HGB BLD-MCNC: 12.6 G/DL (ref 11.1–15.9)
IMM GRANULOCYTES # BLD AUTO: 0 X10E3/UL (ref 0–0.1)
IMM GRANULOCYTES NFR BLD AUTO: 0 %
LDLC SERPL CALC-MCNC: 148 MG/DL (ref 0–99)
LYMPHOCYTES # BLD AUTO: 2.9 X10E3/UL (ref 0.7–3.1)
LYMPHOCYTES NFR BLD AUTO: 46 %
MCH RBC QN AUTO: 29.4 PG (ref 26.6–33)
MCHC RBC AUTO-ENTMCNC: 33.2 G/DL (ref 31.5–35.7)
MCV RBC AUTO: 88 FL (ref 79–97)
MONOCYTES # BLD AUTO: 0.6 X10E3/UL (ref 0.1–0.9)
MONOCYTES NFR BLD AUTO: 9 %
NEUTROPHILS # BLD AUTO: 2.8 X10E3/UL (ref 1.4–7)
NEUTROPHILS NFR BLD AUTO: 43 %
PLATELET # BLD AUTO: 268 X10E3/UL (ref 150–450)
POTASSIUM SERPL-SCNC: 4.6 MMOL/L (ref 3.5–5.2)
PROT SERPL-MCNC: 7.3 G/DL (ref 6–8.5)
RBC # BLD AUTO: 4.29 X10E6/UL (ref 3.77–5.28)
SODIUM SERPL-SCNC: 143 MMOL/L (ref 134–144)
TRIGL SERPL-MCNC: 70 MG/DL (ref 0–149)
VLDLC SERPL CALC-MCNC: 14 MG/DL (ref 5–40)
WBC # BLD AUTO: 6.4 X10E3/UL (ref 3.4–10.8)

## 2019-08-12 NOTE — PROGRESS NOTES
Cholesterol is high work on low fat diet and exercise  Blood sugar is in normal range  Kidney function is mildly impaired - increase water intake and avoid NSAID use ( motrin, ibuprofen)  Normal kidney and liver function   Normal blood count

## 2019-08-22 ENCOUNTER — HOSPITAL ENCOUNTER (INPATIENT)
Age: 54
LOS: 3 days | Discharge: HOME | DRG: 853 | End: 2019-08-26
Attending: EMERGENCY MEDICINE | Admitting: INTERNAL MEDICINE
Payer: COMMERCIAL

## 2019-08-22 ENCOUNTER — APPOINTMENT (OUTPATIENT)
Dept: GENERAL RADIOLOGY | Age: 54
DRG: 853 | End: 2019-08-22
Attending: EMERGENCY MEDICINE
Payer: COMMERCIAL

## 2019-08-22 ENCOUNTER — APPOINTMENT (OUTPATIENT)
Dept: CT IMAGING | Age: 54
DRG: 853 | End: 2019-08-22
Attending: EMERGENCY MEDICINE
Payer: COMMERCIAL

## 2019-08-22 DIAGNOSIS — R65.21 SEPTIC SHOCK (HCC): Primary | ICD-10-CM

## 2019-08-22 DIAGNOSIS — A41.9 SEPTIC SHOCK (HCC): Primary | ICD-10-CM

## 2019-08-22 LAB
ALBUMIN SERPL-MCNC: 2.9 G/DL (ref 3.5–5)
ALBUMIN/GLOB SERPL: 0.6 {RATIO} (ref 1.1–2.2)
ALP SERPL-CCNC: 367 U/L (ref 45–117)
ALT SERPL-CCNC: 42 U/L (ref 12–78)
ANION GAP SERPL CALC-SCNC: 12 MMOL/L (ref 5–15)
AST SERPL-CCNC: 49 U/L (ref 15–37)
BASOPHILS # BLD: 0 K/UL (ref 0–0.1)
BASOPHILS NFR BLD: 0 % (ref 0–1)
BILIRUB SERPL-MCNC: 2.8 MG/DL (ref 0.2–1)
BUN SERPL-MCNC: 55 MG/DL (ref 6–20)
BUN/CREAT SERPL: 13 (ref 12–20)
CALCIUM SERPL-MCNC: 9.1 MG/DL (ref 8.5–10.1)
CHLORIDE SERPL-SCNC: 106 MMOL/L (ref 97–108)
CO2 SERPL-SCNC: 21 MMOL/L (ref 21–32)
CREAT SERPL-MCNC: 4.32 MG/DL (ref 0.55–1.02)
DIFFERENTIAL METHOD BLD: ABNORMAL
EOSINOPHIL # BLD: 0 K/UL (ref 0–0.4)
EOSINOPHIL NFR BLD: 0 % (ref 0–7)
ERYTHROCYTE [DISTWIDTH] IN BLOOD BY AUTOMATED COUNT: 14.4 % (ref 11.5–14.5)
GLOBULIN SER CALC-MCNC: 4.6 G/DL (ref 2–4)
GLUCOSE SERPL-MCNC: 93 MG/DL (ref 65–100)
HCT VFR BLD AUTO: 36 % (ref 35–47)
HGB BLD-MCNC: 12.1 G/DL (ref 11.5–16)
IMM GRANULOCYTES # BLD AUTO: 0 K/UL (ref 0–0.04)
IMM GRANULOCYTES NFR BLD AUTO: 0 % (ref 0–0.5)
LYMPHOCYTES # BLD: 1 K/UL (ref 0.8–3.5)
LYMPHOCYTES NFR BLD: 24 % (ref 12–49)
MCH RBC QN AUTO: 30 PG (ref 26–34)
MCHC RBC AUTO-ENTMCNC: 33.6 G/DL (ref 30–36.5)
MCV RBC AUTO: 89.3 FL (ref 80–99)
MONOCYTES # BLD: 0.2 K/UL (ref 0–1)
MONOCYTES NFR BLD: 5 % (ref 5–13)
NEUTS SEG # BLD: 2.9 K/UL (ref 1.8–8)
NEUTS SEG NFR BLD: 71 % (ref 32–75)
NRBC # BLD: 0.02 K/UL (ref 0–0.01)
NRBC BLD-RTO: 0.5 PER 100 WBC
PLATELET # BLD AUTO: 247 K/UL (ref 150–400)
PMV BLD AUTO: 13 FL (ref 8.9–12.9)
POTASSIUM SERPL-SCNC: 3.9 MMOL/L (ref 3.5–5.1)
PROT SERPL-MCNC: 7.5 G/DL (ref 6.4–8.2)
RBC # BLD AUTO: 4.03 M/UL (ref 3.8–5.2)
RBC MORPH BLD: ABNORMAL
SODIUM SERPL-SCNC: 139 MMOL/L (ref 136–145)
WBC # BLD AUTO: 4.1 K/UL (ref 3.6–11)

## 2019-08-22 PROCEDURE — 87040 BLOOD CULTURE FOR BACTERIA: CPT

## 2019-08-22 PROCEDURE — 74011250637 HC RX REV CODE- 250/637: Performed by: EMERGENCY MEDICINE

## 2019-08-22 PROCEDURE — 74011000258 HC RX REV CODE- 258: Performed by: EMERGENCY MEDICINE

## 2019-08-22 PROCEDURE — 87077 CULTURE AEROBIC IDENTIFY: CPT

## 2019-08-22 PROCEDURE — 85025 COMPLETE CBC W/AUTO DIFF WBC: CPT

## 2019-08-22 PROCEDURE — 74176 CT ABD & PELVIS W/O CONTRAST: CPT

## 2019-08-22 PROCEDURE — C1751 CATH, INF, PER/CENT/MIDLINE: HCPCS

## 2019-08-22 PROCEDURE — 96365 THER/PROPH/DIAG IV INF INIT: CPT

## 2019-08-22 PROCEDURE — 74011250636 HC RX REV CODE- 250/636: Performed by: EMERGENCY MEDICINE

## 2019-08-22 PROCEDURE — 87186 SC STD MICRODIL/AGAR DIL: CPT

## 2019-08-22 PROCEDURE — 36415 COLL VENOUS BLD VENIPUNCTURE: CPT

## 2019-08-22 PROCEDURE — 99285 EMERGENCY DEPT VISIT HI MDM: CPT

## 2019-08-22 PROCEDURE — 83605 ASSAY OF LACTIC ACID: CPT

## 2019-08-22 PROCEDURE — 96361 HYDRATE IV INFUSION ADD-ON: CPT

## 2019-08-22 PROCEDURE — 80053 COMPREHEN METABOLIC PANEL: CPT

## 2019-08-22 RX ORDER — ACETAMINOPHEN 325 MG/1
650 TABLET ORAL
Status: COMPLETED | OUTPATIENT
Start: 2019-08-22 | End: 2019-08-22

## 2019-08-22 RX ORDER — SODIUM CHLORIDE 9 MG/ML
1000 INJECTION, SOLUTION INTRAVENOUS ONCE
Status: COMPLETED | OUTPATIENT
Start: 2019-08-22 | End: 2019-08-22

## 2019-08-22 RX ORDER — SODIUM CHLORIDE 900 MG/100ML
INJECTION INTRAVENOUS
Status: DISPENSED
Start: 2019-08-22 | End: 2019-08-23

## 2019-08-22 RX ORDER — CEFTRIAXONE 1 G/1
INJECTION, POWDER, FOR SOLUTION INTRAMUSCULAR; INTRAVENOUS
Status: DISPENSED
Start: 2019-08-22 | End: 2019-08-23

## 2019-08-22 RX ADMIN — SODIUM CHLORIDE 500 ML: 900 INJECTION, SOLUTION INTRAVENOUS at 20:13

## 2019-08-22 RX ADMIN — SODIUM CHLORIDE 1000 ML: 900 INJECTION, SOLUTION INTRAVENOUS at 21:30

## 2019-08-22 RX ADMIN — ACETAMINOPHEN 650 MG: 325 TABLET ORAL at 20:13

## 2019-08-22 RX ADMIN — SODIUM CHLORIDE 1000 ML: 900 INJECTION, SOLUTION INTRAVENOUS at 20:45

## 2019-08-22 RX ADMIN — CEFTRIAXONE 1 G: 1 INJECTION, POWDER, FOR SOLUTION INTRAMUSCULAR; INTRAVENOUS at 20:45

## 2019-08-22 RX ADMIN — SODIUM CHLORIDE 1000 ML: 900 INJECTION, SOLUTION INTRAVENOUS at 23:09

## 2019-08-23 ENCOUNTER — APPOINTMENT (OUTPATIENT)
Dept: GENERAL RADIOLOGY | Age: 54
DRG: 853 | End: 2019-08-23
Attending: UROLOGY
Payer: COMMERCIAL

## 2019-08-23 ENCOUNTER — APPOINTMENT (OUTPATIENT)
Dept: INTERVENTIONAL RADIOLOGY/VASCULAR | Age: 54
DRG: 853 | End: 2019-08-23
Attending: INTERNAL MEDICINE
Payer: COMMERCIAL

## 2019-08-23 ENCOUNTER — ANESTHESIA (OUTPATIENT)
Dept: SURGERY | Age: 54
DRG: 853 | End: 2019-08-23
Payer: COMMERCIAL

## 2019-08-23 ENCOUNTER — ANESTHESIA EVENT (OUTPATIENT)
Dept: SURGERY | Age: 54
DRG: 853 | End: 2019-08-23
Payer: COMMERCIAL

## 2019-08-23 ENCOUNTER — APPOINTMENT (OUTPATIENT)
Dept: GENERAL RADIOLOGY | Age: 54
DRG: 853 | End: 2019-08-23
Attending: EMERGENCY MEDICINE
Payer: COMMERCIAL

## 2019-08-23 PROBLEM — A41.9 SEPSIS (HCC): Status: ACTIVE | Noted: 2019-08-23

## 2019-08-23 LAB
ANION GAP SERPL CALC-SCNC: 12 MMOL/L (ref 5–15)
APPEARANCE UR: ABNORMAL
ATRIAL RATE: 108 BPM
BACTERIA URNS QL MICRO: ABNORMAL /HPF
BASOPHILS # BLD: 0 K/UL (ref 0–0.1)
BASOPHILS NFR BLD: 0 % (ref 0–1)
BILIRUB UR QL: NEGATIVE
BUN SERPL-MCNC: 53 MG/DL (ref 6–20)
BUN/CREAT SERPL: 13 (ref 12–20)
CALCIUM SERPL-MCNC: 7.8 MG/DL (ref 8.5–10.1)
CALCULATED P AXIS, ECG09: 65 DEGREES
CALCULATED T AXIS, ECG11: 2 DEGREES
CHLORIDE SERPL-SCNC: 110 MMOL/L (ref 97–108)
CO2 SERPL-SCNC: 18 MMOL/L (ref 21–32)
COLOR UR: ABNORMAL
CREAT SERPL-MCNC: 4.06 MG/DL (ref 0.55–1.02)
DIAGNOSIS, 93000: NORMAL
DIFFERENTIAL METHOD BLD: ABNORMAL
EOSINOPHIL # BLD: 0 K/UL (ref 0–0.4)
EOSINOPHIL NFR BLD: 0 % (ref 0–7)
EPITH CASTS URNS QL MICRO: ABNORMAL /LPF
ERYTHROCYTE [DISTWIDTH] IN BLOOD BY AUTOMATED COUNT: 13.7 % (ref 11.5–14.5)
FLUAV AG NPH QL IA: NEGATIVE
FLUBV AG NOSE QL IA: NEGATIVE
GLUCOSE SERPL-MCNC: 136 MG/DL (ref 65–100)
GLUCOSE UR STRIP.AUTO-MCNC: NEGATIVE MG/DL
HCT VFR BLD AUTO: 29.8 % (ref 35–47)
HGB BLD-MCNC: 9.9 G/DL (ref 11.5–16)
HGB UR QL STRIP: ABNORMAL
IMM GRANULOCYTES # BLD AUTO: 0 K/UL (ref 0–0.04)
IMM GRANULOCYTES NFR BLD AUTO: 0 % (ref 0–0.5)
KETONES UR QL STRIP.AUTO: NEGATIVE MG/DL
LACTATE BLD-SCNC: 3.15 MMOL/L (ref 0.4–2)
LACTATE SERPL-SCNC: 1 MMOL/L (ref 0.4–2)
LACTATE SERPL-SCNC: 2.4 MMOL/L (ref 0.4–2)
LEUKOCYTE ESTERASE UR QL STRIP.AUTO: ABNORMAL
LYMPHOCYTES # BLD: 1 K/UL (ref 0.8–3.5)
LYMPHOCYTES NFR BLD: 4 % (ref 12–49)
MCH RBC QN AUTO: 29.7 PG (ref 26–34)
MCHC RBC AUTO-ENTMCNC: 33.2 G/DL (ref 30–36.5)
MCV RBC AUTO: 89.5 FL (ref 80–99)
METAMYELOCYTES NFR BLD MANUAL: 1 %
MONOCYTES # BLD: 1 K/UL (ref 0–1)
MONOCYTES NFR BLD: 4 % (ref 5–13)
NEUTS BAND NFR BLD MANUAL: 5 %
NEUTS SEG # BLD: 21.9 K/UL (ref 1.8–8)
NEUTS SEG NFR BLD: 86 % (ref 32–75)
NITRITE UR QL STRIP.AUTO: NEGATIVE
NRBC # BLD: 0.02 K/UL (ref 0–0.01)
NRBC BLD-RTO: 0.1 PER 100 WBC
P-R INTERVAL, ECG05: 152 MS
PH UR STRIP: 6 [PH] (ref 5–8)
PLATELET # BLD AUTO: 94 K/UL (ref 150–400)
PMV BLD AUTO: 11.1 FL (ref 8.9–12.9)
POTASSIUM SERPL-SCNC: 3.9 MMOL/L (ref 3.5–5.1)
PROT UR STRIP-MCNC: 30 MG/DL
Q-T INTERVAL, ECG07: 334 MS
QRS DURATION, ECG06: 76 MS
QTC CALCULATION (BEZET), ECG08: 447 MS
RBC # BLD AUTO: 3.33 M/UL (ref 3.8–5.2)
RBC #/AREA URNS HPF: ABNORMAL /HPF (ref 0–5)
RBC MORPH BLD: ABNORMAL
SODIUM SERPL-SCNC: 140 MMOL/L (ref 136–145)
SP GR UR REFRACTOMETRY: <1.005 (ref 1–1.03)
TROPONIN I SERPL-MCNC: <0.05 NG/ML
UA: UC IF INDICATED,UAUC: ABNORMAL
UROBILINOGEN UR QL STRIP.AUTO: 0.2 EU/DL (ref 0.2–1)
VENTRICULAR RATE, ECG03: 108 BPM
WBC # BLD AUTO: 24.1 K/UL (ref 3.6–11)
WBC URNS QL MICRO: >100 /HPF (ref 0–4)

## 2019-08-23 PROCEDURE — 87077 CULTURE AEROBIC IDENTIFY: CPT

## 2019-08-23 PROCEDURE — 74011250636 HC RX REV CODE- 250/636: Performed by: INTERNAL MEDICINE

## 2019-08-23 PROCEDURE — 74011250636 HC RX REV CODE- 250/636: Performed by: UROLOGY

## 2019-08-23 PROCEDURE — 74011000258 HC RX REV CODE- 258: Performed by: INTERNAL MEDICINE

## 2019-08-23 PROCEDURE — 85025 COMPLETE CBC W/AUTO DIFF WBC: CPT

## 2019-08-23 PROCEDURE — 65270000029 HC RM PRIVATE

## 2019-08-23 PROCEDURE — 0T768DZ DILATION OF RIGHT URETER WITH INTRALUMINAL DEVICE, VIA NATURAL OR ARTIFICIAL OPENING ENDOSCOPIC: ICD-10-PCS | Performed by: UROLOGY

## 2019-08-23 PROCEDURE — 74420 UROGRAPHY RTRGR +-KUB: CPT

## 2019-08-23 PROCEDURE — 02HV33Z INSERTION OF INFUSION DEVICE INTO SUPERIOR VENA CAVA, PERCUTANEOUS APPROACH: ICD-10-PCS | Performed by: EMERGENCY MEDICINE

## 2019-08-23 PROCEDURE — 36415 COLL VENOUS BLD VENIPUNCTURE: CPT

## 2019-08-23 PROCEDURE — C1769 GUIDE WIRE: HCPCS | Performed by: UROLOGY

## 2019-08-23 PROCEDURE — 87804 INFLUENZA ASSAY W/OPTIC: CPT

## 2019-08-23 PROCEDURE — 76210000006 HC OR PH I REC 0.5 TO 1 HR: Performed by: UROLOGY

## 2019-08-23 PROCEDURE — 74011000250 HC RX REV CODE- 250: Performed by: NURSE ANESTHETIST, CERTIFIED REGISTERED

## 2019-08-23 PROCEDURE — C2617 STENT, NON-COR, TEM W/O DEL: HCPCS | Performed by: UROLOGY

## 2019-08-23 PROCEDURE — 74011250637 HC RX REV CODE- 250/637: Performed by: INTERNAL MEDICINE

## 2019-08-23 PROCEDURE — 77030019927 HC TBNG IRR CYSTO BAXT -A: Performed by: UROLOGY

## 2019-08-23 PROCEDURE — 76060000032 HC ANESTHESIA 0.5 TO 1 HR: Performed by: UROLOGY

## 2019-08-23 PROCEDURE — 80048 BASIC METABOLIC PNL TOTAL CA: CPT

## 2019-08-23 PROCEDURE — 74011000250 HC RX REV CODE- 250: Performed by: EMERGENCY MEDICINE

## 2019-08-23 PROCEDURE — 83605 ASSAY OF LACTIC ACID: CPT

## 2019-08-23 PROCEDURE — 74011000258 HC RX REV CODE- 258: Performed by: NURSE ANESTHETIST, CERTIFIED REGISTERED

## 2019-08-23 PROCEDURE — 93005 ELECTROCARDIOGRAM TRACING: CPT

## 2019-08-23 PROCEDURE — 74011250636 HC RX REV CODE- 250/636: Performed by: NURSE ANESTHETIST, CERTIFIED REGISTERED

## 2019-08-23 PROCEDURE — 87086 URINE CULTURE/COLONY COUNT: CPT

## 2019-08-23 PROCEDURE — C1758 CATHETER, URETERAL: HCPCS | Performed by: UROLOGY

## 2019-08-23 PROCEDURE — 75810000455 HC PLCMT CENT VENOUS CATH LVL 2 5182

## 2019-08-23 PROCEDURE — 76010000138 HC OR TIME 0.5 TO 1 HR: Performed by: UROLOGY

## 2019-08-23 PROCEDURE — 71045 X-RAY EXAM CHEST 1 VIEW: CPT

## 2019-08-23 PROCEDURE — 81001 URINALYSIS AUTO W/SCOPE: CPT

## 2019-08-23 PROCEDURE — 77030018832 HC SOL IRR H20 ICUM -A: Performed by: UROLOGY

## 2019-08-23 PROCEDURE — 84484 ASSAY OF TROPONIN QUANT: CPT

## 2019-08-23 PROCEDURE — 87186 SC STD MICRODIL/AGAR DIL: CPT

## 2019-08-23 PROCEDURE — 74011250637 HC RX REV CODE- 250/637: Performed by: UROLOGY

## 2019-08-23 DEVICE — URETERAL STENT
Type: IMPLANTABLE DEVICE | Site: URETER | Status: FUNCTIONAL
Brand: POLARIS™ ULTRA

## 2019-08-23 RX ORDER — MORPHINE SULFATE 10 MG/ML
2 INJECTION, SOLUTION INTRAMUSCULAR; INTRAVENOUS
Status: DISCONTINUED | OUTPATIENT
Start: 2019-08-23 | End: 2019-08-23 | Stop reason: HOSPADM

## 2019-08-23 RX ORDER — SODIUM CHLORIDE 0.9 % (FLUSH) 0.9 %
5-40 SYRINGE (ML) INJECTION AS NEEDED
Status: DISCONTINUED | OUTPATIENT
Start: 2019-08-23 | End: 2019-08-23 | Stop reason: HOSPADM

## 2019-08-23 RX ORDER — VANCOMYCIN HYDROCHLORIDE
1250
Status: DISCONTINUED | OUTPATIENT
Start: 2019-08-25 | End: 2019-08-24

## 2019-08-23 RX ORDER — FENTANYL CITRATE 50 UG/ML
100 INJECTION, SOLUTION INTRAMUSCULAR; INTRAVENOUS
Status: DISCONTINUED | OUTPATIENT
Start: 2019-08-23 | End: 2019-08-23

## 2019-08-23 RX ORDER — SODIUM CHLORIDE 0.9 % (FLUSH) 0.9 %
5-40 SYRINGE (ML) INJECTION EVERY 8 HOURS
Status: DISCONTINUED | OUTPATIENT
Start: 2019-08-23 | End: 2019-08-26 | Stop reason: HOSPADM

## 2019-08-23 RX ORDER — PROPOFOL 10 MG/ML
INJECTION, EMULSION INTRAVENOUS
Status: DISCONTINUED | OUTPATIENT
Start: 2019-08-23 | End: 2019-08-23 | Stop reason: HOSPADM

## 2019-08-23 RX ORDER — HYDROMORPHONE HYDROCHLORIDE 1 MG/ML
.2-.5 INJECTION, SOLUTION INTRAMUSCULAR; INTRAVENOUS; SUBCUTANEOUS
Status: DISCONTINUED | OUTPATIENT
Start: 2019-08-23 | End: 2019-08-23 | Stop reason: HOSPADM

## 2019-08-23 RX ORDER — MIDAZOLAM HYDROCHLORIDE 1 MG/ML
INJECTION, SOLUTION INTRAMUSCULAR; INTRAVENOUS AS NEEDED
Status: DISCONTINUED | OUTPATIENT
Start: 2019-08-23 | End: 2019-08-23 | Stop reason: HOSPADM

## 2019-08-23 RX ORDER — ONDANSETRON 2 MG/ML
4 INJECTION INTRAMUSCULAR; INTRAVENOUS
Status: DISCONTINUED | OUTPATIENT
Start: 2019-08-23 | End: 2019-08-26 | Stop reason: HOSPADM

## 2019-08-23 RX ORDER — SODIUM CHLORIDE 0.9 % (FLUSH) 0.9 %
5-40 SYRINGE (ML) INJECTION EVERY 8 HOURS
Status: DISCONTINUED | OUTPATIENT
Start: 2019-08-23 | End: 2019-08-23 | Stop reason: HOSPADM

## 2019-08-23 RX ORDER — SODIUM CHLORIDE 9 MG/ML
50 INJECTION, SOLUTION INTRAVENOUS CONTINUOUS
Status: DISCONTINUED | OUTPATIENT
Start: 2019-08-23 | End: 2019-08-26 | Stop reason: HOSPADM

## 2019-08-23 RX ORDER — DIPHENHYDRAMINE HYDROCHLORIDE 50 MG/ML
12.5 INJECTION, SOLUTION INTRAMUSCULAR; INTRAVENOUS AS NEEDED
Status: DISCONTINUED | OUTPATIENT
Start: 2019-08-23 | End: 2019-08-23 | Stop reason: HOSPADM

## 2019-08-23 RX ORDER — FENTANYL CITRATE 50 UG/ML
25 INJECTION, SOLUTION INTRAMUSCULAR; INTRAVENOUS
Status: DISCONTINUED | OUTPATIENT
Start: 2019-08-23 | End: 2019-08-23 | Stop reason: HOSPADM

## 2019-08-23 RX ORDER — HEPARIN SODIUM 200 [USP'U]/100ML
INJECTION, SOLUTION INTRAVENOUS
Status: DISPENSED
Start: 2019-08-23 | End: 2019-08-23

## 2019-08-23 RX ORDER — LIDOCAINE HYDROCHLORIDE 20 MG/ML
20 INJECTION, SOLUTION INFILTRATION; PERINEURAL ONCE
Status: DISPENSED | OUTPATIENT
Start: 2019-08-23 | End: 2019-08-23

## 2019-08-23 RX ORDER — ONDANSETRON 2 MG/ML
INJECTION INTRAMUSCULAR; INTRAVENOUS AS NEEDED
Status: DISCONTINUED | OUTPATIENT
Start: 2019-08-23 | End: 2019-08-23 | Stop reason: HOSPADM

## 2019-08-23 RX ORDER — DEXAMETHASONE SODIUM PHOSPHATE 4 MG/ML
INJECTION, SOLUTION INTRA-ARTICULAR; INTRALESIONAL; INTRAMUSCULAR; INTRAVENOUS; SOFT TISSUE AS NEEDED
Status: DISCONTINUED | OUTPATIENT
Start: 2019-08-23 | End: 2019-08-23 | Stop reason: HOSPADM

## 2019-08-23 RX ORDER — HEPARIN SODIUM 200 [USP'U]/100ML
400 INJECTION, SOLUTION INTRAVENOUS ONCE
Status: DISPENSED | OUTPATIENT
Start: 2019-08-23 | End: 2019-08-23

## 2019-08-23 RX ORDER — PHENYLEPHRINE HCL IN 0.9% NACL 0.4MG/10ML
SYRINGE (ML) INTRAVENOUS AS NEEDED
Status: DISCONTINUED | OUTPATIENT
Start: 2019-08-23 | End: 2019-08-23 | Stop reason: HOSPADM

## 2019-08-23 RX ORDER — HEPARIN SODIUM 5000 [USP'U]/ML
7500 INJECTION, SOLUTION INTRAVENOUS; SUBCUTANEOUS EVERY 8 HOURS
Status: DISCONTINUED | OUTPATIENT
Start: 2019-08-23 | End: 2019-08-24

## 2019-08-23 RX ORDER — ACETAMINOPHEN 325 MG/1
650 TABLET ORAL
Status: DISCONTINUED | OUTPATIENT
Start: 2019-08-23 | End: 2019-08-26 | Stop reason: HOSPADM

## 2019-08-23 RX ORDER — VANCOMYCIN 2 GRAM/500 ML IN 0.9 % SODIUM CHLORIDE INTRAVENOUS
2000 ONCE
Status: COMPLETED | OUTPATIENT
Start: 2019-08-23 | End: 2019-08-23

## 2019-08-23 RX ORDER — SODIUM CHLORIDE 0.9 % (FLUSH) 0.9 %
5-40 SYRINGE (ML) INJECTION AS NEEDED
Status: DISCONTINUED | OUTPATIENT
Start: 2019-08-23 | End: 2019-08-26 | Stop reason: HOSPADM

## 2019-08-23 RX ORDER — PROPOFOL 10 MG/ML
INJECTION, EMULSION INTRAVENOUS AS NEEDED
Status: DISCONTINUED | OUTPATIENT
Start: 2019-08-23 | End: 2019-08-23 | Stop reason: HOSPADM

## 2019-08-23 RX ORDER — NOREPINEPHRINE BITARTRATE/D5W 8 MG/250ML
2-100 PLASTIC BAG, INJECTION (ML) INTRAVENOUS
Status: DISCONTINUED | OUTPATIENT
Start: 2019-08-23 | End: 2019-08-26 | Stop reason: HOSPADM

## 2019-08-23 RX ORDER — ONDANSETRON 2 MG/ML
4 INJECTION INTRAMUSCULAR; INTRAVENOUS AS NEEDED
Status: DISCONTINUED | OUTPATIENT
Start: 2019-08-23 | End: 2019-08-23 | Stop reason: HOSPADM

## 2019-08-23 RX ORDER — SODIUM CHLORIDE, SODIUM LACTATE, POTASSIUM CHLORIDE, CALCIUM CHLORIDE 600; 310; 30; 20 MG/100ML; MG/100ML; MG/100ML; MG/100ML
25 INJECTION, SOLUTION INTRAVENOUS CONTINUOUS
Status: DISCONTINUED | OUTPATIENT
Start: 2019-08-23 | End: 2019-08-23 | Stop reason: HOSPADM

## 2019-08-23 RX ORDER — FENTANYL CITRATE 50 UG/ML
INJECTION, SOLUTION INTRAMUSCULAR; INTRAVENOUS AS NEEDED
Status: DISCONTINUED | OUTPATIENT
Start: 2019-08-23 | End: 2019-08-23 | Stop reason: HOSPADM

## 2019-08-23 RX ADMIN — PROPOFOL 20 MG: 10 INJECTION, EMULSION INTRAVENOUS at 02:13

## 2019-08-23 RX ADMIN — Medication 120 MCG: at 02:18

## 2019-08-23 RX ADMIN — PIPERACILLIN SODIUM,TAZOBACTAM SODIUM 3.38 G: 3; .375 INJECTION, POWDER, FOR SOLUTION INTRAVENOUS at 14:03

## 2019-08-23 RX ADMIN — NOREPINEPHRINE BITARTRATE 4 MCG/MIN: 1 INJECTION INTRAVENOUS at 01:53

## 2019-08-23 RX ADMIN — HEPARIN SODIUM 7500 UNITS: 5000 INJECTION INTRAVENOUS; SUBCUTANEOUS at 14:03

## 2019-08-23 RX ADMIN — PIPERACILLIN SODIUM,TAZOBACTAM SODIUM 3.38 G: 3; .375 INJECTION, POWDER, FOR SOLUTION INTRAVENOUS at 01:14

## 2019-08-23 RX ADMIN — HEPARIN SODIUM 7500 UNITS: 5000 INJECTION INTRAVENOUS; SUBCUTANEOUS at 05:56

## 2019-08-23 RX ADMIN — Medication 10 ML: at 05:51

## 2019-08-23 RX ADMIN — Medication 10 ML: at 21:51

## 2019-08-23 RX ADMIN — FENTANYL CITRATE 12.5 MCG: 50 INJECTION, SOLUTION INTRAMUSCULAR; INTRAVENOUS at 02:13

## 2019-08-23 RX ADMIN — ACETAMINOPHEN 650 MG: 325 TABLET ORAL at 10:17

## 2019-08-23 RX ADMIN — Medication 120 MCG: at 02:09

## 2019-08-23 RX ADMIN — SODIUM CHLORIDE 100 ML/HR: 900 INJECTION, SOLUTION INTRAVENOUS at 03:07

## 2019-08-23 RX ADMIN — Medication 1 AMPULE: at 10:05

## 2019-08-23 RX ADMIN — Medication 10 ML: at 14:05

## 2019-08-23 RX ADMIN — SODIUM CHLORIDE 100 ML/HR: 900 INJECTION, SOLUTION INTRAVENOUS at 01:04

## 2019-08-23 RX ADMIN — PROPOFOL 100 MCG/KG/MIN: 10 INJECTION, EMULSION INTRAVENOUS at 01:58

## 2019-08-23 RX ADMIN — PROPOFOL 30 MG: 10 INJECTION, EMULSION INTRAVENOUS at 02:08

## 2019-08-23 RX ADMIN — MIDAZOLAM HYDROCHLORIDE 1 MG: 1 INJECTION INTRAMUSCULAR; INTRAVENOUS at 01:53

## 2019-08-23 RX ADMIN — Medication 4 MCG/MIN: at 00:43

## 2019-08-23 RX ADMIN — PROPOFOL 20 MG: 10 INJECTION, EMULSION INTRAVENOUS at 01:59

## 2019-08-23 RX ADMIN — HEPARIN SODIUM 7500 UNITS: 5000 INJECTION INTRAVENOUS; SUBCUTANEOUS at 21:52

## 2019-08-23 RX ADMIN — VANCOMYCIN HYDROCHLORIDE 2000 MG: 10 INJECTION, POWDER, LYOPHILIZED, FOR SOLUTION INTRAVENOUS at 05:44

## 2019-08-23 RX ADMIN — ONDANSETRON HYDROCHLORIDE 4 MG: 2 INJECTION, SOLUTION INTRAMUSCULAR; INTRAVENOUS at 02:08

## 2019-08-23 RX ADMIN — Medication 120 MCG: at 02:13

## 2019-08-23 RX ADMIN — DEXAMETHASONE SODIUM PHOSPHATE 4 MG: 4 INJECTION, SOLUTION INTRAMUSCULAR; INTRAVENOUS at 01:59

## 2019-08-23 RX ADMIN — FENTANYL CITRATE 12.5 MCG: 50 INJECTION, SOLUTION INTRAMUSCULAR; INTRAVENOUS at 02:06

## 2019-08-23 NOTE — PROGRESS NOTES
Received a consult. 5 mm right proximal ureteral stone with septic shock. Reviewed labs and imaging and spoke to Dr. Jimmie Biswas and Dr. Eboni Crane. Discussed that her presentation including labs and imaging not completely suggestive of septic shock from right kidney. They are in agreement but no other cause of shock identified and it seems shock emanating from right kidney. No concern for cardiogenic shock or alternate explanation. Of note she did have fever at presentation but did not complain of much pain and her WBC counts normal without left shift. After discussing the ER and admitting hospitalist team, it was decided to proceed with drainage of right kidney as no other explanation. They are also getting EKG and troponins. No concern for PE. I called IR and spoke to Dr. Stephenie Hernandes - kidney malrotated and he felt placement of PCN will be very challenging. I called OR and posted her for emergent right stent placement. They are calling in on-call team and I explained we need to do this asap. In the meantime, ER is continuing fluid resuscitation and starting pressors.

## 2019-08-23 NOTE — ED NOTES
Pts blood pressure continues to drop, Pt is now symptomatic complaining of weakness and reporting Judith Jim feels weird. \" Dr. Kevin Baker made aware. Will continue to assess and monitor.

## 2019-08-23 NOTE — ED NOTES
Spoke with MD and primary RN in regards to patient's elevated POC lactic. Pt now meets \"time zero\" for severe sepsis criteria. Antibiotics and proper fluid bolus ordered. Code purple sheet placed onto patient's chart.

## 2019-08-23 NOTE — PROGRESS NOTES
Pharmacy Automatic Renal Dosing Protocol - Antimicrobials    Indication for Antimicrobials: UTI, sepsis of known etiology    Current Regimen of Each Antimicrobial:  Zosyn 2.25 g IV every 8 hrs (start , day 1)  Vancomycin 2000 mg IV ONCE + 1250 mg IV every 48 hrs (start , day 1)    Previous Antimicrobial Therapy:  Ceftriaxone 1 g IV ONCE ()    Goal Level: Vancomycin Trough: 15 - 20 mcg/mL  (AUC: 400 - 600 mg/hr/Liter/day)     Date Dose & Interval Measured (mcg/mL) Extrapolated (mcg/mL)                       Date & time of next level: after first maintenance dose to be given     Significant Cultures:    blood - pending    Radiology / Imaging results: (X-ray, CT scan or MRI):    CT ABD - 1. A 5 mm calculus in the proximal right ureter results in moderate right  hydronephrosis and perinephric stranding. There are also nonobstructing  bilateral renal calculi. 2. Fibroid uterus. Paralysis, amputations, malnutrition: none noted    Labs:  Recent Labs     19  1950   CREA 4.32*   BUN 55*   WBC 4.1     Temp (24hrs), Av °F (38.3 °C), Min:99.5 °F (37.5 °C), Max:102.4 °F (39.1 °C)    Creatinine Clearance (mL/min) or Dialysis: 12.2 ml/min    Impression/Plan:   Adjusted zosyn to 3.375 g IV every 12 hrs per renal dosing protocol. Ordered vancomycin 2000 mg IV LOAD + 1250 mg IV every 48 hours for an anticipated trough of 16.1 mcg/ml  Antimicrobial stop date 5 days for zosyn. TBD for vanc. Bmp ordered  Monitor SCr. May improve and vanc dose will need to be adjusted. Pharmacy will follow daily and adjust medications as appropriate for renal function and/or serum levels. Thank you,  Paulina Jacobson, CORNELIUSD    Recommended duration of therapy  [email or web address]/Pharmacy/[email or web address] [email or web address]    Renal Dosing  [email or web address]/Pharmacy/Clinical%20Companion/[email or web address] pdf

## 2019-08-23 NOTE — ED PROVIDER NOTES
EMERGENCY DEPARTMENT HISTORY AND PHYSICAL EXAM      Date: 2019  Patient Name: Rachele Solis  Patient Age and Sex: 53 y.o. female    History of Presenting Illness     Chief Complaint   Patient presents with    Referral / Consult     reports that she started with Abd pain on Monday, which was followed by diarrhea on Tuesday, was seen at [facility] with initial BP of 80/60, upon EMS arrival, /70, received 700 mL fluids       History Provided By: Patient    HPI: Rachele Solis, 53 y.o. female c/o fever, chills, diarrhea for the past 4 days. She had some mild right-sided abdominal pain the other day, which resolved. Currently no abdominal pain or nausea or vomiting. She went to urgent care center was noted to be hypotensive and tachycardic, sent to the emergency department. She denies any flank pain or dysuria. No rashes. She has no respiratory symptoms, no chest pain or shortness of breath or cough. No headache or neck pain. Location: Fever and chills, diarrhea  Quality:    Moderate  Severity: Moderate  Duration: 4 days  Timing:    Constant, worsening  Context: None  Modifying factors: None  Associated symptoms: Diarrhea, no shortness of breath or cough    Pt denies any other alleviating or exacerbating factors. There are no other complaints, changes or physical findings at this time. No past medical history on file. Past Surgical History:   Procedure Laterality Date    DELIVERY   12       PCP: Lily Hough MD    Past History   Past Medical History:  No past medical history on file.     Past Surgical History:  Past Surgical History:   Procedure Laterality Date    DELIVERY   12       Family History:  Family History   Problem Relation Age of Onset    Hypertension Mother         ESRD due HTN and proteinuria    Cancer Maternal Aunt         breast and lung    Diabetes Maternal Aunt     Hypertension Maternal Aunt     Heart Disease Neg Hx     Stroke Neg Hx Social History:  Social History     Tobacco Use    Smoking status: Former Smoker     Last attempt to quit: 2000     Years since quittin.6    Smokeless tobacco: Never Used   Substance Use Topics    Alcohol use: No    Drug use: No       Allergies: Allergies   Allergen Reactions    Shellfish Derived Hives       Current Medications:  No current facility-administered medications on file prior to encounter. No current outpatient medications on file prior to encounter. Review of Systems   Review of Systems   Constitutional: Positive for chills, fatigue and fever. Respiratory: Negative for cough and shortness of breath. Gastrointestinal: Positive for abdominal pain ( Mild right-sided pain which resolved) and diarrhea. All other systems reviewed and are negative. Physical Exam   Vital Signs  Patient Vitals for the past 24 hrs:   Temp Pulse Resp BP SpO2   19 0022  (!) 113 25 (!) 83/52 97 %   19 2330  (!) 116 29 (!) 79/52 97 %   19 2240  (!) 112 22 (!) 72/46 97 %   19 2233  (!) 110 28 (!) 75/46 96 %   19 2214  (!) 113 (!) 33 (!) 79/57 95 %   19 2200  (!) 110 (!) 31 (!) 85/53 98 %   19 2130 99.5 °F (37.5 °C) (!) 113 24 98/62 96 %   19 2100  (!) 116 30 91/56 95 %   19 2030  (!) 117 25 105/62 98 %   19 2022    98/70    19 1935 (!) 102.4 °F (39.1 °C) (!) 115 18 119/58 97 %       Physical Exam   Constitutional: She is oriented to person, place, and time. She appears well-developed and well-nourished. No distress. Warm to touch   HENT:   Head: Normocephalic and atraumatic. Eyes: Conjunctivae are normal. Right eye exhibits no discharge. Left eye exhibits no discharge. Neck: Normal range of motion. Neck supple. Cardiovascular: Normal rate, regular rhythm and normal heart sounds. No murmur heard. Pulmonary/Chest: Effort normal and breath sounds normal. No respiratory distress. She has no wheezes. Abdominal: Soft. She exhibits no distension. There is no tenderness. There is no rigidity, no guarding, no CVA tenderness, no tenderness at McBurney's point and negative Meyer's sign. Musculoskeletal: Normal range of motion. She exhibits no deformity. Neurological: She is alert and oriented to person, place, and time. Skin: Skin is warm and dry. No rash noted. There is pallor. Psychiatric: She has a normal mood and affect. Her behavior is normal. Thought content normal.   Nursing note and vitals reviewed. Diagnostic Study Results   Labs  Recent Results (from the past 24 hour(s))   CBC WITH AUTOMATED DIFF    Collection Time: 08/22/19  7:50 PM   Result Value Ref Range    WBC 4.1 3.6 - 11.0 K/uL    RBC 4.03 3.80 - 5.20 M/uL    HGB 12.1 11.5 - 16.0 g/dL    HCT 36.0 35.0 - 47.0 %    MCV 89.3 80.0 - 99.0 FL    MCH 30.0 26.0 - 34.0 PG    MCHC 33.6 30.0 - 36.5 g/dL    RDW 14.4 11.5 - 14.5 %    PLATELET 749 067 - 033 K/uL    MPV 13.0 (H) 8.9 - 12.9 FL    NRBC 0.5 (H) 0  WBC    ABSOLUTE NRBC 0.02 (H) 0.00 - 0.01 K/uL    NEUTROPHILS 71 32 - 75 %    LYMPHOCYTES 24 12 - 49 %    MONOCYTES 5 5 - 13 %    EOSINOPHILS 0 0 - 7 %    BASOPHILS 0 0 - 1 %    IMMATURE GRANULOCYTES 0 0.0 - 0.5 %    ABS. NEUTROPHILS 2.9 1.8 - 8.0 K/UL    ABS. LYMPHOCYTES 1.0 0.8 - 3.5 K/UL    ABS. MONOCYTES 0.2 0.0 - 1.0 K/UL    ABS. EOSINOPHILS 0.0 0.0 - 0.4 K/UL    ABS. BASOPHILS 0.0 0.0 - 0.1 K/UL    ABS. IMM.  GRANS. 0.0 0.00 - 0.04 K/UL    DF MANUAL      RBC COMMENTS JOHN CELLS  PRESENT       METABOLIC PANEL, COMPREHENSIVE    Collection Time: 08/22/19  7:50 PM   Result Value Ref Range    Sodium 139 136 - 145 mmol/L    Potassium 3.9 3.5 - 5.1 mmol/L    Chloride 106 97 - 108 mmol/L    CO2 21 21 - 32 mmol/L    Anion gap 12 5 - 15 mmol/L    Glucose 93 65 - 100 mg/dL    BUN 55 (H) 6 - 20 MG/DL    Creatinine 4.32 (H) 0.55 - 1.02 MG/DL    BUN/Creatinine ratio 13 12 - 20      GFR est AA 13 (L) >60 ml/min/1.73m2    GFR est non-AA 11 (L) >60 ml/min/1.73m2    Calcium 9.1 8.5 - 10.1 MG/DL    Bilirubin, total 2.8 (H) 0.2 - 1.0 MG/DL    ALT (SGPT) 42 12 - 78 U/L    AST (SGOT) 49 (H) 15 - 37 U/L    Alk. phosphatase 367 (H) 45 - 117 U/L    Protein, total 7.5 6.4 - 8.2 g/dL    Albumin 2.9 (L) 3.5 - 5.0 g/dL    Globulin 4.6 (H) 2.0 - 4.0 g/dL    A-G Ratio 0.6 (L) 1.1 - 2.2     LACTIC ACID    Collection Time: 08/22/19 11:42 PM   Result Value Ref Range    Lactic acid 2.4 (HH) 0.4 - 2.0 MMOL/L       Radiologic Studies  CT ABD PELV WO CONT   Final Result   IMPRESSION:    1. A 5 mm calculus in the proximal right ureter results in moderate right   hydronephrosis and perinephric stranding. There are also nonobstructing   bilateral renal calculi. 2. Fibroid uterus. XR CHEST PA LAT    (Results Pending)   IR NEPHROSTOMY PERC RT PLC CATH  SI    (Results Pending)   XR CHEST PORT    (Results Pending)     CT Results  (Last 48 hours)               08/22/19 2253  CT ABD PELV WO CONT Final result    Impression:  IMPRESSION:    1. A 5 mm calculus in the proximal right ureter results in moderate right   hydronephrosis and perinephric stranding. There are also nonobstructing   bilateral renal calculi. 2. Fibroid uterus. Narrative:  EXAM:  CT abdomen pelvis without contrast       INDICATION: Abdominal pain. Fever. COMPARISON: None. TECHNIQUE: Helical CT of the abdomen  and pelvis  without contrast. Coronal and   sagittal reformats are performed. CT dose reduction was achieved through use of   a standardized protocol tailored for this examination and automatic exposure   control for dose modulation. FINDINGS:    Solid organ evaluation is limited without contrast.        The visualized lung bases demonstrate no mass or consolidation. The heart size   is normal. There is no pericardial or pleural effusion.        There is a 5 mm calculus in the proximal right ureter resulting in moderate   right hydronephrosis and perinephric stranding. There are additional   nonobstructing bilateral renal calculi measuring 1 to 3 mm. There is no left   hydronephrosis. A lesion at the lower pole of the left kidney measures 2.5 x 2.6   cm with a Hounsfield unit of 18-20 suggesting a cyst.       The liver and spleen are unremarkable. There are gallstones without biliary duct   dilatation. The pancreas and adrenal glands are unremarkable. There are no dilated bowel loops. The appendix is normal.         There are no enlarged lymph nodes. There is no free fluid or free air. The   aorta tapers without aneurysm. The urinary bladder is unremarkable. Several pelvic phleboliths are noted. There   are uterine fibroids measuring up to 3.5 x 4.2 cm. There is no adnexal mass. The bony structures are age-appropriate.                CXR Results  (Last 48 hours)    None          Procedures     CRITICAL CARE (ASAP ONLY)  Performed by: Frieda Mejias MD  Authorized by: Frieda Mejias MD     Critical care provider statement:     Critical care time (minutes):  65    Critical care time was exclusive of:  Separately billable procedures and treating other patients    Critical care was necessary to treat or prevent imminent or life-threatening deterioration of the following conditions:  Circulatory failure, shock and sepsis    Critical care was time spent personally by me on the following activities:  Development of treatment plan with patient or surrogate, discussions with consultants, examination of patient, ordering and review of laboratory studies, ordering and review of radiographic studies, ordering and performing treatments and interventions, pulse oximetry, re-evaluation of patient's condition and review of old charts  Central Line  Date/Time: 8/23/2019 12:31 AM  Performed by: Frieda Mejias MD  Authorized by: Frieda Mejias MD     Consent:     Consent obtained:  Verbal    Consent given by:  Patient    Risks discussed: Arterial puncture, bleeding, incorrect placement and infection    Alternatives discussed:  Delayed treatment  Pre-procedure details:     Hand hygiene: Hand hygiene performed prior to insertion      Sterile barrier technique: All elements of maximal sterile technique followed      Skin preparation:  ChloraPrep    Skin preparation agent: Skin preparation agent completely dried prior to procedure    Anesthesia (see MAR for exact dosages): Anesthesia method:  Local infiltration    Local anesthetic:  Lidocaine 1% w/o epi  Procedure details:     Location:  R internal jugular    Patient position:  Trendelenburg    Procedural supplies:  Triple lumen    Landmarks identified: yes      Ultrasound guidance: yes      Sterile ultrasound techniques: Sterile gel and sterile probe covers were used      Number of attempts:  1    Successful placement: yes    Post-procedure details:     Post-procedure:  Dressing applied    Assessment:  Blood return through all ports and free fluid flow    Patient tolerance of procedure: Tolerated well, no immediate complications        Medical Decision Making     Provider Notes (Medical Decision Making):   13-year-old female with septic shock, unclear source, no significant respiratory or intra-abdominal symptoms. There is no evidence of cardiogenic shock or massive pulmonary embolism. CT scan demonstrates a 5 mm stone on the right with mild/moderate hydronephrosis. This is presumably the cause of sepsis (urosepsis) as there is no other identifiable infectious source. Patient was treated with fluids, ceftriaxone initially. Worsened blood pressure, central line was placed and patient started on norepinephrine. Antibiotic coverage was broadened to Zosyn and vancomycin. Urology was consulted. They also spoke with IR about the possibility of percutaneous nephrostomy. Ultimately it was decided to take patient for ureteral stent. . Currently patient is starting norepinephrine infusion, chest x-ray pending, and admit to MICU, urology coming shortly to take patient to the OR. Juan M Anne MD  12:29 AM        Consult required? Urology for urgent ureteral stent      Medications Administered During ED Course:  Medications   sodium chloride 0.9 % bolus infusion 1,000 mL (0 mL IntraVENous IV Completed 8/23/19 0022)     Followed by   sodium chloride 0.9 % bolus infusion 1,000 mL (0 mL IntraVENous IV Completed 8/23/19 0022)     Followed by   sodium chloride 0.9 % bolus infusion 1,000 mL (0 mL IntraVENous Held 8/22/19 2137)     Followed by   sodium chloride 0.9 % bolus infusion 90 mL (has no administration in time range)   cefTRIAXone (ROCEPHIN) 1 gram injection (has no administration in time range)   0.9% sodium chloride (MBP/ADV) infusion (has no administration in time range)   NOREPINephrine (LEVOPHED) 8 mg in 5% dextrose 250mL infusion (has no administration in time range)   heparinized saline 2 units/mL infusion 800 Units (has no administration in time range)   lidocaine (XYLOCAINE) 20 mg/mL (2 %) injection 400 mg (has no administration in time range)   iopamidol (ISOVUE-370) 76 % injection 100 mL (has no administration in time range)   piperacillin-tazobactam (ZOSYN) 2.25 g in 0.9% sodium chloride (MBP/ADV) 50 mL (has no administration in time range)   0.9% sodium chloride infusion (has no administration in time range)   sodium chloride 0.9 % bolus infusion 500 mL (0 mL IntraVENous IV Completed 8/22/19 2239)   acetaminophen (TYLENOL) tablet 650 mg (650 mg Oral Given 8/22/19 2013)   0.9% sodium chloride infusion 1,000 mL (0 mL IntraVENous IV Completed 8/22/19 2200)   cefTRIAXone (ROCEPHIN) 1 g in 0.9% sodium chloride (MBP/ADV) 50 mL MBP (0 g IntraVENous IV Completed 8/22/19 2239)          Diagnosis     Disposition:  Admitted    Clinical Impression:   1.  Septic shock ([facility] [address]        Attestation:  I personally performed the services described in this documentation on this date 8/22/2019 for patient Brenna Smith. Alem Bennett MD        I was the first provider for this patient on this visit. To the best of my ability I reviewed relevant prior medical records, electrocardiograms, laboratories, and radiologic studies. The patient's presenting problems were discussed, and the patient was in agreement with the care plan formulated and outlined with them. Please note that this dictation was completed with Dragon voice recognition software. Quite often unanticipated grammatical, syntax, homophones, and other interpretive errors are inadvertently transcribed by the computer software. Please disregard these errors and excuse any errors that have escaped final proofreading.

## 2019-08-23 NOTE — PROGRESS NOTES
5794  Patient admitted from PACU.  VSS-see flow. Patient drowsy but easily aroused and appropriate. Patient denies pain. 0500  Daughter came to bedside to see patient. Oriented to unit and plan of care. 3968  Report to Cynthia Tiwari RN.     Arnel Clinton RN, BSN, CCM

## 2019-08-23 NOTE — PROGRESS NOTES
Bedside shift change report given to Sharyle Bruin (oncoming nurse) by Refugio Tiwari RN (offgoing nurse). Report included the following information SBAR.     0715: AM assessment complete. 1200: Reassessment complete. 1455: Orders received from Dr. Sary Obregon for pt to keep barr in place and to removed central line. 1418: Central line removed. Pressure held for 5 minutes with no signs of bleeding or hematoma. Pressure dsg applied.      1440: Report called to Renan Boykin

## 2019-08-23 NOTE — CONSULTS
Consultation Note    NAME: Cheryl Meza   :  1965   MRN:  [id number]     Date/Time:  2019 8:41 AM    I have been asked to see this patient by Dr. Tod Reinoso  for advice/opinion re: Bertha Brooks. Assessment :    Plan:  HAL  Mild non-AG metabolic acidosis  Anemia/thrombocytopenia  Sepsis  Hypotension  Mom on HD for 20 yrs from HTN    5 mm calculus in the proximal right ureter results in moderate right hydronephrosis and perinephric stranding Creatinine 1.1  and normal previous to that, 4.3 yesterday and 4.1 today    Non-oliguric, but no postobstructive diuresis    Prerenal azotemia + ATN; a small contribution from obstructive uropathy    Will need to trend creatinine, uop and check a urine sediment after resolution of the acute event. Just off levo    S/p cystoscopy, right ureteral stent     Continue with generous hydration       Subjective:   CHIEF COMPLAINT:  hal    HISTORY OF PRESENT ILLNESS:     Nikita Smith is a 53 y.o.   female who has a history of no medical issues. She saw her PCP a few weeks ago and her creatinine was up a smidge and she was told to increase hydration (she is not a great hydrator). No nsaids. No htn, dm. Her mother has been on HD for 20 years (in [place], [place] and protein in the urine). She developed right sided abdominal pain. Some n/v. + generalized weakness associated with fever and chills. Feeling better. Just off levo and on NS at 100. History reviewed. No pertinent past medical history.    Past Surgical History:   Procedure Laterality Date    DELIVERY   12     Social History     Tobacco Use    Smoking status: Former Smoker     Last attempt to quit: 2000     Years since quittin.6    Smokeless tobacco: Never Used   Substance Use Topics    Alcohol use: No      Family History   Problem Relation Age of Onset    Hypertension Mother         ESRD due HTN and proteinuria    Cancer Maternal Aunt         breast and lung    Diabetes Maternal Aunt     Hypertension Maternal Aunt     Heart Disease Neg Hx     Stroke Neg Hx       Allergies   Allergen Reactions    Shellfish Derived Hives      Prior to Admission medications    Not on File     REVIEW OF SYSTEMS:     []  Unable to obtain reliable ROS due to  [] mental status  [] sedated   [] intubated   [x] Total of 12 systems reviewed as follows:  Constitutional: + fever, + chills, negative weight loss  Eyes:   negative visual changes  ENT:   negative sore throat, tongue or lip swelling  Respiratory:  negative cough, negative dyspnea  Cards:  negative for chest pain, palpitations, lower extremity edema  GI:   + for nausea, vomiting and abdominal pain  :  negative for frequency, dysuria  Integument:  negative for rash and pruritus  Heme:  negative for easy bruising and gum/nose bleeding  Musculoskel: negative for myalgias,  back pain; + generalized muscle weakness  Neuro:  negative for headaches, dizziness, vertigo  Psych:  negative for feelings of anxiety, depression   Travel?: none    Objective:   VITALS:    Visit Vitals  /68   Pulse 86   Temp 98.4 °F (36.9 °C)   Resp 24   Ht 5' 3\" (1.6 m)   Wt 103 kg (227 lb)   SpO2 97%   BMI 40.21 kg/m²     PHYSICAL EXAM:  Gen:  []  WD []  WN  [] cachectic []  thin [x]  obese []  disheveled             []  ill apearing  []   Critical  []   Chronic    [x]  No acute distress    HEENT:   [x] NC/AT/PERRLA/EOMI    [] pink conjunctivae      [] pale conjunctivae                  PERRL  [] yes  [] no      [] moist mucosa    [] dry mucosa    hearing intact to voice [x] yes  [] No                 NECK:   supple [x] yes  [] no        masses [] yes  [] No               thyroid  []  non tender  []  tender    RESP:   [x] CTA bilaterally/no wheezing/rhonchi/rales/crackles    [] rhonchi bilaterally - no dullness  [] wheezing   [] rhonchi   [] crackles     use of accessory muscles [] yes [x] no    CARD:   [x]  regular rate and rhythm/No murmurs/rubs/gallops    murmur  [] yes ()  [] no Rubs  [] yes  [] no       Gallops [] yes  [] no    Rate []  regular  []  irregular        carotid bruits  [] Right  []  Left                 LE edema [] yes  [x] no           JVP  []  yes   []  no    ABD:    [x] soft/non distended/non tender/+bowel sounds/no HSM    []  Rigid    tenderness [] yes [] no   Liver enlargement  []  yes []  no                Spleen enlargement  []  yes []  no     distended []  yes [] no     bowel sound  [] hypoactive   [] hyperactive    LYMPH:    Neck []  yes [x]  no       Axillae []  yes []  no    SKIN:   Rashes []  yes   [x]  no    Ulcers []  yes   []  no               [] tight to palpitation    skin turgor []  good  [] poor  [] decreased               Cyanosis/clubbing []  yes []  no    NEUR:   [x] cranial nerves II-XII grossly intact       [] Cranial nerves deficit                 []  facial droop    []  slurred speech   [] aphasic     [] Strength normal     []  weakness  []  LUE  []   RUE/ []  LLE  []   RLE    follows commands  []  yes []  no           PSYCH:   insight [] poor [x] good   Alert and Oriented to  [x] person  [x] place  [x]  time                    [] depressed [] anxious [] agitated  [] lethargic [] stuporous  [] sedated     LAB DATA REVIEWED:    Recent Labs     08/23/19 0353 08/22/19 1950   WBC 24.1* 4.1   HGB 9.9* 12.1   HCT 29.8* 36.0   PLT 94* 247     Recent Labs     08/23/19 0353 08/22/19 1950    139   K 3.9 3.9   * 106   CO2 18* 21   BUN 53* 55*   CREA 4.06* 4.32*   * 93   CA 7.8* 9.1     Recent Labs     08/22/19 1950   SGOT 49*   ALT 42   *   TBILI 2.8*   ALB 2.9*   GLOB 4.6*     No results for input(s): INR, PTP, APTT in the last 72 hours. No lab exists for component: INREXT   No results for input(s): FE, TIBC, PSAT, FERR in the last 72 hours. No results for input(s): PH, PCO2, PO2 in the last 72 hours. No results for input(s): CPK, CKMB in the last 72 hours.     No lab exists for component: TROPONINI  No results found for: Parveen Kang    Procedures: see electronic medical records for all procedures/Xrays and details which were not copied into this note but were reviewed prior to creation of Plan.    ________________________________________________________________________       ___________________________________________________  Consulting Physician:  Blanca Rubalcava MD

## 2019-08-23 NOTE — ED NOTES
TRANSFER - OUT REPORT:    Verbal report given to Nell Cornelius RN(name) on Estefania Reese  being transferred to OR(unit) for ordered procedure       Report consisted of patients Situation, Background, Assessment and   Recommendations(SBAR). Information from the following report(s) SBAR, ED Summary, [facility] and Recent Results was reviewed with the receiving nurse. Lines:   Triple Lumen 08/23/19 Right Neck (Active)   Central Line Being Utilized Yes 8/23/2019 12:23 AM   Site Assessment Clean, dry, & intact 8/23/2019 12:23 AM   Infiltration Assessment 0 8/23/2019 12:23 AM   Dressing Status Clean, dry, & intact 8/23/2019 12:23 AM   Dressing Type Transparent 8/23/2019 12:23 AM   Positive Blood Return (Medial Site) Yes 8/23/2019 12:23 AM   Positive Blood Return (Lateral Site) Yes 8/23/2019 12:23 AM   Positive Blood Return (Site #3) Yes 8/23/2019 12:23 AM       Peripheral IV 08/22/19 Left Hand (Active)   Site Assessment Clean, dry, & intact 8/22/2019  8:28 PM   Phlebitis Assessment 0 8/22/2019  8:28 PM   Infiltration Assessment 0 8/22/2019  8:28 PM   Dressing Status Clean, dry, & intact 8/22/2019  8:28 PM   Dressing Type Transparent 8/22/2019  8:28 PM   Hub Color/Line Status Pink 8/22/2019  8:28 PM       Peripheral IV 08/22/19 Right Antecubital (Active)   Site Assessment Clean, dry, & intact 8/22/2019  8:28 PM   Phlebitis Assessment 0 8/22/2019  8:28 PM   Infiltration Assessment 0 8/22/2019  8:28 PM   Dressing Status Clean, dry, & intact 8/22/2019  8:28 PM   Dressing Type Transparent 8/22/2019  8:28 PM   Hub Color/Line Status Pink 8/22/2019  8:28 PM        Opportunity for questions and clarification was provided.

## 2019-08-23 NOTE — ED NOTES
Dr. José Miguel Jimenez aware of Pts low blood pressure,pt is not symptomatic at this time, Dr. Susy Knox at bedside. No orders received. Will continue ot assess and monitor.

## 2019-08-23 NOTE — ANESTHESIA PREPROCEDURE EVALUATION
Relevant Problems   No relevant active problems       Anesthetic History   No history of anesthetic complications            Review of Systems / Medical History  Patient summary reviewed, nursing notes reviewed and pertinent labs reviewed    Pulmonary  Within defined limits                 Neuro/Psych   Within defined limits           Cardiovascular  Within defined limits                Exercise tolerance: >4 METS     GI/Hepatic/Renal         Renal disease: stones       Endo/Other        Morbid obesity     Other Findings   Comments: Sepsis           Physical Exam    Airway  Mallampati: III  TM Distance: 4 - 6 cm  Neck ROM: normal range of motion   Mouth opening: Normal     Cardiovascular  Regular rate and rhythm,  S1 and S2 normal,  no murmur, click, rub, or gallop             Dental  No notable dental hx       Pulmonary  Breath sounds clear to auscultation               Abdominal  GI exam deferred       Other Findings            Anesthetic Plan    ASA: 3, emergent  Anesthesia type: general            Anesthetic plan and risks discussed with: Patient and Family      MAC or LMA

## 2019-08-23 NOTE — PROGRESS NOTES
[facility] Consult Service Progress NOTE  Pulmonary, Critical Care, and Sleep Medicine    Name: Dorothea Nova MRN: [id number]   : 1965 Hospital: [facility]   Date: 2019  Admission Date: 2019     Chart and notes reviewed. Data reviewed. Pt seen on rounds earlier today. I have evaluated and examined the patient. Pt is unstable and acutely ill in the CCU. Patient was re-evaluated multiple times with repeated discussions with CCU/ER team throughout the day    Hospital Day: 2    I was asked by Francie Tilley MD to see Dorothea Nova a 53 y.o.  female  in consultation for a chief complaint of septic shock      Excerpts from admission notes as follows:     Bernardino Franco, 53 y.o. female c/o fever, chills, diarrhea for the past 4 days. She had some mild right-sided abdominal pain the other day, which resolved. Currently no abdominal pain or nausea or vomiting. She went to urgent care center was noted to be hypotensive and tachycardic, sent to the emergency department. She denies any flank pain or dysuria. No rashes. She has no respiratory symptoms, no chest pain or shortness of breath or cough. No headache or neck pain. Also had generalized weakness, right flank pain starting 3 days ago. CT abdomen was done and show right proximal ureteral stone with hydronephrosis. \" reports that she started with Abd pain on Monday, which was followed by diarrhea on Tuesday, was seen at [facility] with initial BP of 80/60, upon EMS arrival, /70, received 700 mL fluids \"    Pt now in CCU on IV levophed. Pt taken to OR by Dr. Chloe Caballero, Urology for cystoscopy, right ureteral stent. Isac pus seen with hydronephrotic drip. Pt now on ABx. Still on IV levophed. No SOB. No nausea. IMPRESSION:   1. Sepsis with septic shock   2. Pyelonephritis secondary to kidney stone  3. Right ureteral stone with moderate right hydronephrosis   4.  S/p cystoscopy, right ureteral stent 8/23  5. Acute renal failure most likely secondary to prerenal and obstructive uropathy  6. Elevated LFT most likely reactive   7. remote former smoker  8. Uterine fibroids  9. Multiorgan dysfunction as outlined above: Pt has one or more acute or chronic illnesses with severe exacerbation with progression or side effects of treatment that poses a threat to life or bodily function  10. Additional workup outlined below  11. Pt is at high risk of sudden decline and decompensation with life threatening consequenses and continued end organ dysfunction and failure  12. Pt is critically ill. Time spent with pt and staff actively rendering care, managing pt and coordinating care as stated below;  35 minutes, exclusive of any procedures      RECOMMENDATIONS/PLAN:   1. CCU monitoring  2. Agree with Empiric IV antibiotics pending culture results   3. Follow culture results  4. IV vasopressors for circulatory shock refractory to fluids to maintain MAP > 65  5. Transfuse prn to maintain Hgb > 7  6. Labs to follow electrolytes, renal function and and blood counts  7. Bronchial hygiene with respiratory therapy techniques, bronchodilators  8. Pt needs IV fluids with additives and Drug therapy requiring intensive monitoring for toxicity  9. Prescription drug management with home med reconciliation reviewed  10. DVT, SUP prophylaxis  11.  Will be available to assist in medical management while in the CCU pending disposition     [x] High complexity decision making was performed  [x] See my orders for details    [facility] reviewed and pertinent medications noted or modified as needed   Current Facility-Administered Medications   Medication    NOREPINephrine (LEVOPHED) 8 mg in 5% dextrose 250mL infusion    heparinized saline 2 units/mL infusion 800 Units    lidocaine (XYLOCAINE) 20 mg/mL (2 %) injection 400 mg    iopamidol (ISOVUE-370) 76 % injection 100 mL    0.9% sodium chloride infusion    piperacillin-tazobactam (ZOSYN) 3.375 g in 0.9% sodium chloride (MBP/ADV) 100 mL    heparinized saline 2 units/mL 1,000 unit/500 mL infusion    [START ON 2019] vancomycin (VANCOCIN) 1250 mg in  ml infusion    sodium chloride (NS) flush 5-40 mL    sodium chloride (NS) flush 5-40 mL    acetaminophen (TYLENOL) tablet 650 mg    ondansetron (ZOFRAN) injection 4 mg    heparin (porcine) injection 7,500 Units    alcohol 62% (NOZIN) nasal  1 Ampule    sodium chloride 0.9 % bolus infusion 1,000 mL    Followed by   Arvil Fitch sodium chloride 0.9 % bolus infusion 90 mL    cefTRIAXone (ROCEPHIN) 1 gram injection    0.9% sodium chloride (MBP/ADV) infusion      PMH:  has no past medical history of Abuse, Anemia NEC, Arrhythmia, Arthritis, Asthma, Autoimmune disease ([address] CAD (coronary artery disease), Calculus of kidney, Cancer ([address] Chronic kidney disease, Chronic pain, Congestive heart failure, unspecified, Contact dermatitis and other eczema, due to unspecified cause, COPD, Depression, Diabetes ([address] GERD (gastroesophageal reflux disease), Headache(784.0), Hypercholesterolemia, Hypertension, Liver disease, Psychotic disorder ([address] PUD (peptic ulcer disease), Seizures ([address] Stroke ([address] Thromboembolus ([address] Thyroid disease, or Trauma. PSH:   has a past surgical history that includes delivery  (). FHX: family history includes Cancer in her maternal aunt; Diabetes in her maternal aunt; Hypertension in her maternal aunt and mother. SHX:  reports that she quit smoking about 19 years ago. She has never used smokeless tobacco. She reports that she does not drink alcohol or use drugs. ROS:A comprehensive review of systems was negative except for that written in the HPI. Hemodynamics:    CO:    CI:    CVP:    SVR:   PAP Systolic:    PAP Diastolic:    PVR:    AW50:        Ventilator Settings:      Mode Rate TV Press PEEP FiO2 PIP Min.  Vent                              Vital Signs: Telemetry:    normal sinus rhythm Intake/Output:   Visit Vitals  /68   Pulse 86   Temp 98.4 °F (36.9 °C)   Resp 24   Ht 5' 3\" (1.6 m)   Wt 103 kg (227 lb)   SpO2 97%   BMI 40.21 kg/m²       Temp (24hrs), Av.1 °F (37.3 °C), Min:97.8 °F (36.6 °C), Max:102.4 °F (39.1 °C)        O2 Device: Room air O2 Flow Rate (L/min): 2 l/min       Wt Readings from Last 4 Encounters:   19 103 kg (227 lb)   19 102.5 kg (226 lb)   18 96.2 kg (212 lb)   17 98.5 kg (217 lb 3.2 oz)          Intake/Output Summary (Last 24 hours) at 2019 0834  Last data filed at 2019 0700  Gross per 24 hour   Intake 1848.8 ml   Output 580 ml   Net 1268.8 ml       Last shift:      No intake/output data recorded. Last 3 shifts: 1901 -  0700  In: 1848.8 [I.V.:1848.8]  Out: 80 [Urine:580]       Physical Exam:    89 [facility] American female;    HEAD: Normocephalic, without obvious abnormality, atraumatic   EYES: conjunctivae clear. PERRL, AN Icteric sclerae   NOSE: nares normal, no drainage, no nasal flaring,    THROAT: mucous membranes moist; Lips, mucosa dry; No Thrush; tongue midline   Neck: Supple, symmetrical, trachea midline,  No accessory mm use; No Stridor/ cuff leak, No goiter or thyroid tenderness   LYMPH: No abnormally enlarged lymph nodes. in neck or groin   Chest: normal   Lungs: decreased air exchange bilaterally at bases   Heart: Regular rate and rhythm ;  NO edema   Abdomen: soft, protuberant, without guarding, without rebound   :  Blanca, clear urine; NO gross hematuria   Extremity: negative, cyanosis, clubbing; no joint swelling or erythema   Neuro: alert; verbal;  withdraws to pain; unable to check gait and station; does follow simple commands   Psych: oriented to time, place and person;  No agitation   Skin: Pallor;    Pulses:Bilateral, Radial, 1+   Capillary refill: normal ;sluggish capillary refill,      DATA:    MAR reviewed and pertinent medications noted or modified as needed  MEDS:   Current Facility-Administered Medications   Medication    NOREPINephrine (LEVOPHED) 8 mg in 5% dextrose 250mL infusion    heparinized saline 2 units/mL infusion 800 Units    lidocaine (XYLOCAINE) 20 mg/mL (2 %) injection 400 mg    iopamidol (ISOVUE-370) 76 % injection 100 mL    0.9% sodium chloride infusion    piperacillin-tazobactam (ZOSYN) 3.375 g in 0.9% sodium chloride (MBP/ADV) 100 mL    heparinized saline 2 units/mL 1,000 unit/500 mL infusion    [START ON 8/25/2019] vancomycin (VANCOCIN) 1250 mg in  ml infusion    sodium chloride (NS) flush 5-40 mL    sodium chloride (NS) flush 5-40 mL    acetaminophen (TYLENOL) tablet 650 mg    ondansetron (ZOFRAN) injection 4 mg    heparin (porcine) injection 7,500 Units    alcohol 62% (NOZIN) nasal  1 Ampule    sodium chloride 0.9 % bolus infusion 1,000 mL    Followed by    sodium chloride 0.9 % bolus infusion 90 mL    cefTRIAXone (ROCEPHIN) 1 gram injection    0.9% sodium chloride (MBP/ADV) infusion        Labs:    Recent Labs     08/23/19  0353 08/22/19  1950   WBC 24.1* 4.1   HGB 9.9* 12.1   PLT 94* 247     Recent Labs     08/23/19  0353 08/22/19  2342 08/22/19  1950     --  139   K 3.9  --  3.9   *  --  106   CO2 18*  --  21   *  --  93   BUN 53*  --  55*   CREA 4.06*  --  4.32*   CA 7.8*  --  9.1   LAC 1.0 2.4*  --    ALB  --   --  2.9*   SGOT  --   --  49*   ALT  --   --  42     No results for input(s): PH, PCO2, PO2, HCO3, FIO2 in the last 72 hours. Recent Labs     08/23/19  0047   TROIQ <0.05     No results found for: BNPP, BNP   Lab Results   Component Value Date/Time    Culture result: (A) 08/22/2019 07:50 PM     GRAM NEGATIVE RODS GROWING IN 2 OF 4 BOTTLES DRAWN (SITES= R AC AND L H)    Culture result: (A) 08/22/2019 07:50 PM     PRELIMINARY REPORT OF GRAM NEGATIVE RODS GROWING IN 2 OF 4 BOTTLES DRAWN CALLED TO AND READ BACK BY Margarita Francisco RN AT 4810 8/23/19.  LWY    Culture result: 08/22/2019 07:50 PM     FOUR OF FOUR BOTTLES HAVE BEEN FLAGGED POSITIVE BY INSTRUMENT. BOTTLES HAVE BEEN SENT TO [facility] TO ASSESS FOR POSSIBLE GROWTH. Lab Results   Component Value Date/Time    TSH 0.973 08/09/2018 09:05 AM        Imaging:    Results from Hospital Encounter encounter on 08/22/19   XR RETROGRADE PYELOGRAM BILAT    Narrative EXAM:  XR RETROGRADE PYELOGRAM BILAT    INDICATION: Intra-Op    COMPARISON: CT 8/22/2019. FINDINGS: Intraoperative fluoroscopic views of the abdomen demonstrate ureteral  instrumentation with a ureteral stent in place. Please refer to the operative  report for additional details. Impression IMPRESSION: Intraoperative procedure progress. INTERPRETATION PROVIDED FOR COMPLIANCE ONLY AT NO CHARGE          Results from Hospital Encounter encounter on 08/22/19   CT ABD PELV WO CONT    Narrative EXAM:  CT abdomen pelvis without contrast    INDICATION: Abdominal pain. Fever. COMPARISON: None. TECHNIQUE: Helical CT of the abdomen  and pelvis  without contrast. Coronal and  sagittal reformats are performed. CT dose reduction was achieved through use of  a standardized protocol tailored for this examination and automatic exposure  control for dose modulation. FINDINGS:   Solid organ evaluation is limited without contrast.     The visualized lung bases demonstrate no mass or consolidation. The heart size  is normal. There is no pericardial or pleural effusion. There is a 5 mm calculus in the proximal right ureter resulting in moderate  right hydronephrosis and perinephric stranding. There are additional  nonobstructing bilateral renal calculi measuring 1 to 3 mm. There is no left  hydronephrosis. A lesion at the lower pole of the left kidney measures 2.5 x 2.6  cm with a Hounsfield unit of 18-20 suggesting a cyst.    The liver and spleen are unremarkable. There are gallstones without biliary duct  dilatation.  The pancreas and adrenal glands are unremarkable. There are no dilated bowel loops. The appendix is normal.      There are no enlarged lymph nodes. There is no free fluid or free air. The  aorta tapers without aneurysm. The urinary bladder is unremarkable. Several pelvic phleboliths are noted. There  are uterine fibroids measuring up to 3.5 x 4.2 cm. There is no adnexal mass. The bony structures are age-appropriate. Impression IMPRESSION:   1. A 5 mm calculus in the proximal right ureter results in moderate right  hydronephrosis and perinephric stranding. There are also nonobstructing  bilateral renal calculi. 2. Fibroid uterus. This care involved high complexity decision making which includes independently reviewing the patient's past medical records, current laboratory results, medication profiles that were immediately available to me and actual Xray images at the bedside in order to assess, support vital system function, and to treat this degree of vital organ system failure, and to prevent further life threatening deterioration of the patients condition. I was in direct communication with the nursing staff throughout this time.     Medical Decision Making Today  · Reviewed the flowsheet and previous days notes  · Reviewed and summarized records or history from previous days note or discussions with staff, family  · Parenteral controlled substances - Reviewed/ Adjusted / Levorn Hoe / Started  · High Risk Drug therapy requiring intensive monitoring for toxicity: eg steroids, pressors, antibiotics  · Review and order of Clinical lab tests  · Review and Order of Radiology tests  · Review and Order of Medicine tests  · Independent visualization of radiologic Images  · Reviewed Ventilator / NiPPV  · I have personally reviewed the patients ECG / Telemetry    My assessment/management discussed with: Consultants, Nursing, Pharmacy, Case Management, PT, OT, Respiratory Therapy, Hospitalist and Family for coordination of dashawn Walter MD

## 2019-08-23 NOTE — BRIEF OP NOTE
BRIEF OPERATIVE NOTE    Date of Procedure: 8/23/2019   Preoperative Diagnosis: right kidney stone with septic shock  Postoperative Diagnosis: same   Procedure(s):  CYSTOSCOPY URETERAL STENT INSERTION - right, barr placement  Surgeon(s) and Role:     * Es Mohr MD - Primary         Surgical Assistant:none    Surgical Staff:  Circ-1: Ayleen Elise RN  Scrub Tech-1: Chiquita Kanner  No case tracking events are documented in the log. Anesthesia: General   Estimated Blood Loss: none  Specimens: right renal pelvis urine  Findings: manjula pus and hydronephrotic drip.   Complications: none  Implants: 6x24 DJ stent

## 2019-08-23 NOTE — PERIOP NOTES
Handoff Report from Operating Room to PACU    Report received from Mireya Blanca CRNA and Jennifer Payne RN regarding Rachele Solis. Surgeon(s):  Korey Thompson MD  And Procedure(s) (LRB):  CYSTOSCOPY URETERAL STENT INSERTION OR REMOVAL (Right)  confirmed   with allergies discussed. Anesthesia type, drugs, patient history, complications, estimated blood loss, vital signs, intake and output, and last pain medication, lines and temperature were reviewed.

## 2019-08-23 NOTE — H&P
Hospitalist Admission Note    NAME: Real Babb   :  1965   MRN:  [id number]     Date/Time:  2019 6:05 AM    Patient PCP: Michelle Collier MD  ______________________________________________________________________  Given the patient's current clinical presentation, I have a high level of concern for decompensation if discharged from the emergency department. Complex decision making was performed, which includes reviewing the patient's available past medical records, laboratory results, and x-ray films. My assessment of this patient's clinical condition and my plan of care is as follows. Assessment / Plan:    Sepsis with septic shock most likely secondary to pyelonephritis secondary to kidney stone resulting in moderate right hydronephrosis  Admit patient to the ICU  Start patient on IV fluid  Start patient on IV antibiotic  Urology consultation  Keep patient n.p.o.  Follow-up repeat lactic acid  -start IV pressor     Acute renal failure most likely secondary to prerenal and obstructive uropathy  Start patient on IV fluid  Nephrology consultation at a.m. Elevated LFT most likely reactive  Monitoring      Code Status: Full  Surrogate Decision Maker:Ayan Lucas    DVT Prophylaxis: heparin   GI Prophylaxis: not indicated    Baseline:independent       Subjective:   CHIEF COMPLAINT: Right Flank Pain     HISTORY OF PRESENT ILLNESS:     53years old female from home with no past medical history presented to the hospital for evaluation of generalized weakness associated with fever and chills started today, right flank pain 3 days ago, patient denies any shortness of breath any chest pain, CT abdomen was done and show right proximal ureteral stone with hydronephrosis. We were asked to admit for work up and evaluation of the above problems. History reviewed. No pertinent past medical history.      Past Surgical History:   Procedure Laterality Date    DELIVERY   12       Social History     Tobacco Use    Smoking status: Former Smoker     Last attempt to quit: 2000     Years since quittin.6    Smokeless tobacco: Never Used   Substance Use Topics    Alcohol use: No        Family History   Problem Relation Age of Onset    Hypertension Mother         ESRD due HTN and proteinuria    Cancer Maternal Aunt         breast and lung    Diabetes Maternal Aunt     Hypertension Maternal Aunt     Heart Disease Neg Hx     Stroke Neg Hx      Allergies   Allergen Reactions    Shellfish Derived Hives        Prior to Admission medications    Not on File       REVIEW OF SYSTEMS:     I am not able to complete the review of systems because:    The patient is intubated and sedated    The patient has altered mental status due to his acute medical problems    The patient has baseline aphasia from prior stroke(s)    The patient has baseline dementia and is not reliable historian    The patient is in acute medical distress and unable to provide information           Total of 12 systems reviewed as follows:       POSITIVE= underlined text  Negative = text not underlined  General:  fever, chills, sweats, generalized weakness, weight loss/gain,      loss of appetite   Eyes:    blurred vision, eye pain, loss of vision, double vision  ENT:    rhinorrhea, pharyngitis   Respiratory:   cough, sputum production, SOB, SHAH, wheezing, pleuritic pain   Cardiology:   chest pain, palpitations, orthopnea, PND, edema, syncope   Gastrointestinal:  abdominal pain , N/V, diarrhea, dysphagia, constipation, bleeding   Genitourinary:  frequency, urgency, dysuria, hematuria, incontinence   Muskuloskeletal :  arthralgia, myalgia, back pain  Hematology:  easy bruising, nose or gum bleeding, lymphadenopathy   Dermatological: rash, ulceration, pruritis, color change / jaundice  Endocrine:   hot flashes or polydipsia   Neurological:  headache, dizziness, confusion, focal weakness, paresthesia,     Speech difficulties, memory loss, gait difficulty  Psychological: Feelings of anxiety, depression, agitation    Objective:   VITALS:    Visit Vitals  BP 99/64   Pulse 89   Temp 98.8 °F (37.1 °C)   Resp 24   Ht 5' 3\" (1.6 m)   Wt 103 kg (227 lb)   SpO2 97%   BMI 40.21 kg/m²       PHYSICAL EXAM:    General:    Alert, cooperative, no distress, appears stated age. HEENT: Atraumatic, anicteric sclerae, pink conjunctivae     No oral ulcers, mucosa moist, throat clear, dentition fair  Neck:  Supple, symmetrical,  thyroid: non tender  Lungs:   Clear to auscultation bilaterally. No Wheezing or Rhonchi. No rales. Chest wall:  No tenderness  No Accessory muscle use. Heart:   Regular  rhythm,  No  murmur   No edema  Abdomen:   Soft, Right Flank tender. Not distended. Bowel sounds normal  Extremities: No cyanosis. No clubbing,      Skin turgor normal, Capillary refill normal, Radial dial pulse 2+  Skin:     Not pale. Not Jaundiced  No rashes   Psych:  Good insight. Not depressed. Not anxious or agitated. Neurologic: EOMs intact. No facial asymmetry. No aphasia or slurred speech. Symmetrical strength, Sensation grossly intact.  Alert and oriented X 4.     _______________________________________________________________________  Care Plan discussed with:    Comments   Patient y    Family  y    RN y    Care Manager                    Consultant:      _______________________________________________________________________  Expected  Disposition:   Home with Family y   HH/PT/OT/RN    SNF/LTC    MIHIR    ________________________________________________________________________  TOTAL TIME:  72  Minutes    Critical Care Provided  35   Minutes non procedure based      Comments    y Reviewed previous records   >50% of visit spent in counseling and coordination of care y Discussion with patient and/or family and questions answered ________________________________________________________________________  Signed: Denilson Alva MD    Procedures: see electronic medical records for all procedures/Xrays and details which were not copied into this note but were reviewed prior to creation of Plan. LAB DATA REVIEWED:    Recent Results (from the past 24 hour(s))   CBC WITH AUTOMATED DIFF    Collection Time: 08/22/19  7:50 PM   Result Value Ref Range    WBC 4.1 3.6 - 11.0 K/uL    RBC 4.03 3.80 - 5.20 M/uL    HGB 12.1 11.5 - 16.0 g/dL    HCT 36.0 35.0 - 47.0 %    MCV 89.3 80.0 - 99.0 FL    MCH 30.0 26.0 - 34.0 PG    MCHC 33.6 30.0 - 36.5 g/dL    RDW 14.4 11.5 - 14.5 %    PLATELET 374 583 - 361 K/uL    MPV 13.0 (H) 8.9 - 12.9 FL    NRBC 0.5 (H) 0  WBC    ABSOLUTE NRBC 0.02 (H) 0.00 - 0.01 K/uL    NEUTROPHILS 71 32 - 75 %    LYMPHOCYTES 24 12 - 49 %    MONOCYTES 5 5 - 13 %    EOSINOPHILS 0 0 - 7 %    BASOPHILS 0 0 - 1 %    IMMATURE GRANULOCYTES 0 0.0 - 0.5 %    ABS. NEUTROPHILS 2.9 1.8 - 8.0 K/UL    ABS. LYMPHOCYTES 1.0 0.8 - 3.5 K/UL    ABS. MONOCYTES 0.2 0.0 - 1.0 K/UL    ABS. EOSINOPHILS 0.0 0.0 - 0.4 K/UL    ABS. BASOPHILS 0.0 0.0 - 0.1 K/UL    ABS. IMM. GRANS. 0.0 0.00 - 0.04 K/UL    DF MANUAL      RBC COMMENTS JOHN CELLS  PRESENT       METABOLIC PANEL, COMPREHENSIVE    Collection Time: 08/22/19  7:50 PM   Result Value Ref Range    Sodium 139 136 - 145 mmol/L    Potassium 3.9 3.5 - 5.1 mmol/L    Chloride 106 97 - 108 mmol/L    CO2 21 21 - 32 mmol/L    Anion gap 12 5 - 15 mmol/L    Glucose 93 65 - 100 mg/dL    BUN 55 (H) 6 - 20 MG/DL    Creatinine 4.32 (H) 0.55 - 1.02 MG/DL    BUN/Creatinine ratio 13 12 - 20      GFR est AA 13 (L) >60 ml/min/1.73m2    GFR est non-AA 11 (L) >60 ml/min/1.73m2    Calcium 9.1 8.5 - 10.1 MG/DL    Bilirubin, total 2.8 (H) 0.2 - 1.0 MG/DL    ALT (SGPT) 42 12 - 78 U/L    AST (SGOT) 49 (H) 15 - 37 U/L    Alk.  phosphatase 367 (H) 45 - 117 U/L    Protein, total 7.5 6.4 - 8.2 g/dL    Albumin 2.9 (L) 3.5 - 5.0 g/dL Globulin 4.6 (H) 2.0 - 4.0 g/dL    A-G Ratio 0.6 (L) 1.1 - 2.2     CULTURE, BLOOD, PAIRED    Collection Time: 08/22/19  7:50 PM   Result Value Ref Range    Special Requests: NO SPECIAL REQUESTS      Culture result:        TWO OF FOUR BOTTLES HAVE BEEN FLAGGED POSITIVE BY INSTRUMENT. BOTTLES HAVE BEEN SENT TO [facility] TO ASSESS FOR POSSIBLE GROWTH.     Culture result: REMAINING BOTTLE(S) HAS/HAVE NO GROWTH SO FAR     LACTIC ACID    Collection Time: 08/22/19 11:42 PM   Result Value Ref Range    Lactic acid 2.4 (HH) 0.4 - 2.0 MMOL/L   TROPONIN I    Collection Time: 08/23/19 12:47 AM   Result Value Ref Range    Troponin-I, Qt. <0.05 <0.05 ng/mL   INFLUENZA A & B AG (RAPID TEST)    Collection Time: 08/23/19 12:48 AM   Result Value Ref Range    Influenza A Antigen NEGATIVE  NEG      Influenza B Antigen NEGATIVE  NEG     URINALYSIS W/ REFLEX CULTURE    Collection Time: 08/23/19  2:20 AM   Result Value Ref Range    Color YELLOW/STRAW      Appearance TURBID (A) CLEAR      Specific gravity <1.005 1.003 - 1.030    pH (UA) 6.0 5.0 - 8.0      Protein 30 (A) NEG mg/dL    Glucose NEGATIVE  NEG mg/dL    Ketone NEGATIVE  NEG mg/dL    Bilirubin NEGATIVE  NEG      Blood LARGE (A) NEG      Urobilinogen 0.2 0.2 - 1.0 EU/dL    Nitrites NEGATIVE  NEG      Leukocyte Esterase LARGE (A) NEG      WBC >100 (H) 0 - 4 /hpf    RBC 0-5 0 - 5 /hpf    Epithelial cells FEW FEW /lpf    Bacteria 2+ (A) NEG /hpf    UA:UC IF INDICATED URINE CULTURE ORDERED (A) CNI     LACTIC ACID    Collection Time: 08/23/19  3:53 AM   Result Value Ref Range    Lactic acid 1.0 0.4 - 2.0 MMOL/L   METABOLIC PANEL, BASIC    Collection Time: 08/23/19  3:53 AM   Result Value Ref Range    Sodium 140 136 - 145 mmol/L    Potassium 3.9 3.5 - 5.1 mmol/L    Chloride 110 (H) 97 - 108 mmol/L    CO2 18 (L) 21 - 32 mmol/L    Anion gap 12 5 - 15 mmol/L    Glucose 136 (H) 65 - 100 mg/dL    BUN 53 (H) 6 - 20 MG/DL    Creatinine 4.06 (H) 0.55 - 1.02 MG/DL    BUN/Creatinine ratio 13 12 - 20      GFR est AA 14 (L) >60 ml/min/1.73m2    GFR est non-AA 11 (L) >60 ml/min/1.73m2    Calcium 7.8 (L) 8.5 - 10.1 MG/DL   CBC WITH AUTOMATED DIFF    Collection Time: 08/23/19  3:53 AM   Result Value Ref Range    WBC 24.1 (H) 3.6 - 11.0 K/uL    RBC 3.33 (L) 3.80 - 5.20 M/uL    HGB 9.9 (L) 11.5 - 16.0 g/dL    HCT 29.8 (L) 35.0 - 47.0 %    MCV 89.5 80.0 - 99.0 FL    MCH 29.7 26.0 - 34.0 PG    MCHC 33.2 30.0 - 36.5 g/dL    RDW 13.7 11.5 - 14.5 %    PLATELET 94 (L) 359 - 400 K/uL    MPV 11.1 8.9 - 12.9 FL    NRBC 0.1 (H) 0  WBC    ABSOLUTE NRBC 0.02 (H) 0.00 - 0.01 K/uL    NEUTROPHILS 86 (H) 32 - 75 %    BAND NEUTROPHILS 5 %    LYMPHOCYTES 4 (L) 12 - 49 %    MONOCYTES 4 (L) 5 - 13 %    EOSINOPHILS 0 0 - 7 %    BASOPHILS 0 0 - 1 %    METAMYELOCYTES 1 %    IMMATURE GRANULOCYTES 0 0.0 - 0.5 %    ABS. NEUTROPHILS 21.9 (H) 1.8 - 8.0 K/UL    ABS. LYMPHOCYTES 1.0 0.8 - 3.5 K/UL    ABS. MONOCYTES 1.0 0.0 - 1.0 K/UL    ABS. EOSINOPHILS 0.0 0.0 - 0.4 K/UL    ABS. BASOPHILS 0.0 0.0 - 0.1 K/UL    ABS. IMM.  GRANS. 0.0 0.00 - 0.04 K/UL    DF AUTOMATED      RBC COMMENTS NORMOCYTIC, NORMOCHROMIC

## 2019-08-23 NOTE — PROGRESS NOTES
Sepsis with septic shock most likely secondary to pyelonephritis secondary to kidney stone resulting in moderate right hydronephrosis  Admit patient to the ICU  Start patient on IV fluid  Start patient on IV antibiotic  Urology consultation  Keep patient n.p.o.  Follow-up repeat lactic acid    Acute renal failure most likely secondary to prerenal and obstructive uropathy  Start patient on IV fluid  Nephrology consultation at a.m. Elevated LFT most likely reactive  Monitoring      53years old female from home with no past medical history presented to the hospital for evaluation of generalized weakness associated with fever and chills started today, right flank pain 3 days ago, patient denies any shortness of breath any chest pain, CT abdomen was done and show right proximal ureteral stone with hydronephrosis.

## 2019-08-23 NOTE — ANESTHESIA POSTPROCEDURE EVALUATION
Procedure(s):  CYSTOSCOPY RIGHT URETERAL STENT INSERTION OR REMOVAL. general    Anesthesia Post Evaluation        Patient location during evaluation: PACU  Note status: Adequate. Level of consciousness: responsive to verbal stimuli and sleepy but conscious  Pain management: satisfactory to patient  Airway patency: patent  Anesthetic complications: no  Cardiovascular status: acceptable  Respiratory status: acceptable  Hydration status: acceptable  Comments: +Post-Anesthesia Evaluation and Assessment    Patient: Nilo Flores MRN: [id number]  SSN: [id number]   YOB: 1965  Age: 53 y.o. Sex: female      Cardiovascular Function/Vital Signs    BP (P) 99/64 (BP 1 Location: Left arm, BP Patient Position: At rest)   Pulse (!) 102   Temp 36.6 °C (97.8 °F)   Resp 26   Ht 5' 3\" (1.6 m)   Wt 103 kg (227 lb)   SpO2 97%   BMI 40.21 kg/m²     Patient is status post Procedure(s):  CYSTOSCOPY RIGHT URETERAL STENT INSERTION OR REMOVAL. Nausea/Vomiting: Controlled. Postoperative hydration reviewed and adequate. Pain:  Pain Scale 1: (P) Numeric (0 - 10) (08/23/19 0245)  Pain Intensity 1: (P) 0 (08/23/19 0245)   Managed. Neurological Status:   Neuro (WDL): (P) Exceptions to WDL (08/23/19 0245)   At baseline. Mental Status and Level of Consciousness: Arousable. Pulmonary Status:   O2 Device: (P) CO2 nasal cannula (08/23/19 0245)   Adequate oxygenation and airway patent. Complications related to anesthesia: None    Post-anesthesia assessment completed. No concerns. Signed By: Prabha Fuentes MD    8/23/2019  Post anesthesia nausea and vomiting:  controlled      Vitals Value Taken Time   BP 98/67 8/23/2019  2:50 AM   Temp 36.6 °C (97.8 °F) 8/23/2019  2:40 AM   Pulse 100 8/23/2019  2:52 AM   Resp 27 8/23/2019  2:52 AM   SpO2 96 % 8/23/2019  2:52 AM   Vitals shown include unvalidated device data.

## 2019-08-23 NOTE — PROGRESS NOTES
Pharmacy - Heparin Dose Adjustment    Indication: DVT prophylaxis     Current Dose: Heparin 5000 units subcutaneously every 8 hours    Weight Ht Readings from Last 1 Encounters:   08/22/19 160 cm (63\")        Height Wt Readings from Last 1 Encounters:   08/22/19 103 kg (227 lb)        BMI Body mass index is 40.21 kg/m².      Impression/Plan:   Change to  Heparin 7500 units subcutaneously every 8 hours per protocol for obese patients with BMI >40     Augustine Avilez, PharmD

## 2019-08-23 NOTE — PROGRESS NOTES
Nutrition:  Chart reviewed, case discussed during CCU rounds. Labs reviewed, K <4. No hx of CKD. Per discussion with Dr. Alejandro easton to liberalize to Regular diet.       Cb, [address]   Pager [phone]

## 2019-08-23 NOTE — CONSULTS
Urology Consult    Patient: Yonathan Del Toro MRN: [id number]  SSN: [id number]    YOB: 1965  Age: 53 y.o. Sex: female          Date of Consultation:  2019  Requesting Physician: Luis York MD  Reason for Consultation:  Right ureteral stone         History of Present Illness:  Yonathan Del Toro is a 53 y.o. female admitted 2019 to the hospital for septic shock. She presented with fever and chills. Had right flank pain 3 days ago. Denies pain at this time. Initially 102F at presentation. CT showed 5 mm right proximal ureter stone with hydronephrosis. Initially /58 however developed severe hypotension in the ER. Urology consulted. Currently needing pressor support. mentating normally and denies any discomfort. No past medical history on file. Past Surgical History:   Procedure Laterality Date    DELIVERY          Allergies   Allergen Reactions    Shellfish Derived Hives        Prior to Admission medications    Not on File       Social History     Tobacco Use    Smoking status: Former Smoker     Last attempt to quit: 2000     Years since quittin.6    Smokeless tobacco: Never Used   Substance Use Topics    Alcohol use: No        Family History   Problem Relation Age of Onset    Hypertension Mother         ESRD due HTN and proteinuria    Cancer Maternal Aunt         breast and lung    Diabetes Maternal Aunt     Hypertension Maternal Aunt     Heart Disease Neg Hx     Stroke Neg Hx         ROS:  Admission ROS from 2019 was reviewed and changes (other than per HPI) include: none. Physical Exam:    Vital Signs:  Temp (24hrs), Av °F (38.3 °C), Min:99.5 °F (37.5 °C), Max:102.4 °F (39.1 °C)   Blood pressure (!) 79/52, pulse (!) 116, temperature 99.5 °F (37.5 °C), resp. rate 29, height 5' 3\" (1.6 m), weight 103 kg (227 lb), SpO2 97 %. I&O's:  No intake/output data recorded.     Appearance: well-developed NAD Conjunctiva/Lids: conjunctivae and lids normal   External Ears/Nose: normal no lesions or deformities   Neck: supple   Respiratory Effort: breathing easily   Lower Extremity: no edema   Abdomen/Flank: soft non-tender, no CVA tenderness   Liver/Spleen: no organomegaly   Hernia: no ventral hernia   Gait/Station: normal   Skin Inspection: warm and dry   Mood/Affect: normal      Lab Review:     CBC   Recent Labs     08/22/19  1950   WBC 4.1   HGB 12.1   HCT 36.0        CMP   Recent Labs     08/22/19 1950      K 3.9      CO2 21   GLU 93   BUN 55*   CREA 4.32*   CA 9.1   ALB 2.9*   TBILI 2.8*   SGOT 49*   ALT 42       Other No results for input(s): INR, PSA in the last 72 hours. No lab exists for component: PTT, INREXT    UA:   Lab Results   Component Value Date/Time    Color Yellow 01/22/2013 12:02 PM    Appearance Clear 01/22/2013 12:02 PM    pH (UA) 6.0 01/22/2013 12:02 PM    Ketone Negative 01/22/2013 12:02 PM    Bilirubin Negative 01/22/2013 12:02 PM    Nitrites Negative 01/22/2013 12:02 PM    Leukocyte Esterase Trace (A) 01/22/2013 12:02 PM    Bacteria Few 01/22/2013 12:02 PM    WBC 6-10 (A) 01/22/2013 12:02 PM    RBC 0-3 01/22/2013 12:02 PM       Cultures:  pending    Imaging:  reviewed    Assessment:     5 mm right proximal ureteral stone, septic shock with mild right mone-nephric stranding. See separate note for details of discussions with various teams. Plan:     1. Fluid resuscitation aggressively  2. Broad spectrum IV antibiotics  3. Urgent drainage of right kidney.     Signed By: Chava Flores MD  - August 23, 2019

## 2019-08-23 NOTE — INTERDISCIPLINARY ROUNDS
Critical care interdisciplinary rounds held on 08/24/2019. Following members present, Pharmacy, Diabetes Treatment, Case Management, Respiratory Therapy, Physical Therapy and Nutrition. Led by TIAGO Ibarra RN and Dr. Alan Ortega. Plan of care discussed. See clinical pathway for plan of care and interventions and desired outcomes.

## 2019-08-23 NOTE — PROGRESS NOTES
Hospitalist Progress Note    NAME: Brenna Smith   :  1965   MRN:  [id number]       Assessment / Plan:  Sepsis with septic shock most likely secondary to pyelonephritis secondary to kidney stone resulting in moderate right hydronephrosis  --s/p cystoscopy w Rt ureteral stent  --urology management appreicated  --barr placed, good uop, fevers/chills/resolved  --cont IV abx  --follow cxs  --off pressors     Acute renal failure most likely secondary to prerenal and obstructive uropathy  as above, UOP improved  --cont IVF  --nephrology consulted  --trend Cr  --HAL with Cr of > 4 POA  --per notes, had slight elevation of Cr prior to symps (flank pain several days ago which resolved prior to admission)  --baseline Cr had been < 1.0 in last available records from a year ago    Body mass index is 40.21 kg/m². Obesity  -advise lifestyle mods, weight loss       Code Status: Full  Surrogate Decision Maker:yAan Lucas     DVT Prophylaxis: heparin   GI Prophylaxis: not indicated     Baseline:independent      Subjective:     Chief Complaint / Reason for Physician Visit  Presented with fevers/chills weakness, which are resolved. No acute complaints    Discussed with RN events overnight. Review of Systems:  Symptom Y/N Comments  Symptom Y/N Comments   Fever/Chills n   Chest Pain n    Poor Appetite n   Edema     Cough n   Abdominal Pain n    Sputum n   Joint Pain     SOB/SHAH n   Pruritis/Rash     Nausea/vomit n   Tolerating PT/OT     Diarrhea n   Tolerating Diet     Constipation n   Other       Could NOT obtain due to:      Objective:     VITALS:   Last 24hrs VS reviewed since prior progress note.  Most recent are:  Patient Vitals for the past 24 hrs:   Temp Pulse Resp BP SpO2   19 0900  93 21 102/65 99 %   19 0800  86 24 100/68 97 %   19 0750  87 26 99/67 97 %   19 0739  89 16 98/66 98 %   19 0729  82 22 100/69 98 %   19 0715 98.4 °F (36.9 °C) 85 20 101/69 98 % 08/23/19 0700  82 19 104/63 95 %   08/23/19 0645  86 23 102/65 97 %   08/23/19 0630  88 23 108/65 97 %   08/23/19 0615  92 25  96 %   08/23/19 0600  92 22  97 %   08/23/19 0545  83 20 100/59 96 %   08/23/19 0530  90 21 99/71 96 %   08/23/19 0515  89 24 99/66 96 %   08/23/19 0500  86 20 105/71 95 %   08/23/19 0445  89 24 99/64 97 %   08/23/19 0430  100 23 98/66 95 %   08/23/19 0415  93 24 100/67 96 %   08/23/19 0400 98.8 °F (37.1 °C) 97 24 96/69 95 %   08/23/19 0345 98.8 °F (37.1 °C) 99 26  94 %   08/23/19 0315 98.3 °F (36.8 °C) 96 22 102/62 97 %   08/23/19 0300  99 26 100/68 97 %   08/23/19 0255  98 22 96/67 96 %   08/23/19 0250  100 28 98/67 95 %   08/23/19 0245  (!) 102 26 99/64 97 %   08/23/19 0240 97.8 °F (36.6 °C) (!) 104 28 94/64 97 %   08/23/19 0235  (!) 108 28 92/66    08/23/19 0109  (!) 115 (!) 31 96/66 95 %   08/23/19 0100 98.4 °F (36.9 °C) (!) 114 28 93/62 99 %   08/23/19 0030  (!) 110 27 (!) 82/52 97 %   08/23/19 0022  (!) 113 25 (!) 83/52 97 %   08/22/19 2330  (!) 116 29 (!) 79/52 97 %   08/22/19 2240  (!) 112 22 (!) 72/46 97 %   08/22/19 2233  (!) 110 28 (!) 75/46 96 %   08/22/19 2214  (!) 113 (!) 33 (!) 79/57 95 %   08/22/19 2200  (!) 110 (!) 31 (!) 85/53 98 %   08/22/19 2130 99.5 °F (37.5 °C) (!) 113 24 98/62 96 %   08/22/19 2100  (!) 116 30 91/56 95 %   08/22/19 2030  (!) 117 25 105/62 98 %   08/22/19 2022    98/70    08/22/19 1935 (!) 102.4 °F (39.1 °C) (!) 115 18 119/58 97 %       Intake/Output Summary (Last 24 hours) at 8/23/2019 1102  Last data filed at 8/23/2019 0948  Gross per 24 hour   Intake 1848.8 ml   Output 1030 ml   Net 818.8 ml        PHYSICAL EXAM:  General: WD, WN. Alert, cooperative, no acute distress    EENT:  EOMI. Anicteric sclerae. MMM  Resp:  CTA bilaterally, no wheezing or rales.   No accessory muscle use  CV:  Regular  rhythm,  No edema  GI:  Soft, Non distended, Non tender.  +Bowel sounds  Neurologic:  Alert and oriented X 3, normal speech, Psych:   Good insight. Not anxious nor agitated  Skin:  No rashes. No jaundice    Reviewed most current lab test results and cultures  YES  Reviewed most current radiology test results   YES  Review and summation of old records today    NO  Reviewed patient's current orders and MAR    YES  PMH/SH reviewed - no change compared to H&P  ________________________________________________________________________  Care Plan discussed with:    Comments   Patient x    Family  x    RN x    Care Manager     Consultant                       x Multidiciplinary team rounds were held today with , nursing, pharmacist and clinical coordinator. Patient's plan of care was discussed; medications were reviewed and discharge planning was addressed. ________________________________________________________________________  Total NON critical care TIME:  35   Minutes    Total CRITICAL CARE TIME Spent:   Minutes non procedure based      Comments   >50% of visit spent in counseling and coordination of care x    ________________________________________________________________________  Delsie Na, DO     Procedures: see electronic medical records for all procedures/Xrays and details which were not copied into this note but were reviewed prior to creation of Plan. LABS:  I reviewed today's most current labs and imaging studies.   Pertinent labs include:  Recent Labs     08/23/19 0353 08/22/19 1950   WBC 24.1* 4.1   HGB 9.9* 12.1   HCT 29.8* 36.0   PLT 94* 247     Recent Labs     08/23/19  0353 08/22/19  1950    139   K 3.9 3.9   * 106   CO2 18* 21   * 93   BUN 53* 55*   CREA 4.06* 4.32*   CA 7.8* 9.1   ALB  --  2.9*   TBILI  --  2.8*   SGOT  --  49*   ALT  --  42       Signed: Erasmo Knox, DO

## 2019-08-23 NOTE — ED NOTES
Pt reports that she started with Abd pain on Monday, which was followed by diarrhea on Tuesday, was seen at [facility] with initial BP of 80/60, upon EMS arrival, /70, received 700 mL fluids)

## 2019-08-23 NOTE — PERIOP NOTES
TRANSFER - OUT REPORT:    Verbal report given to Idania Steward on Rachele Solis  being transferred to ICU [id number] for routine progression of care       Report consisted of patients Situation, Background, Assessment and   Recommendations(SBAR). Information from the following report(s) SBAR, Kardex, OR Summary, Intake/Output, MAR and Cardiac Rhythm NSR was reviewed with the receiving nurse. Opportunity for questions and clarification was provided.       Patient transported with:   Monitor  O2 @ 2 liters  Registered Nurse

## 2019-08-23 NOTE — OP NOTES
[address]  OPERATIVE REPORT    Name:  Maureen Whitlock  MR#:  [id number]  :  1965  ACCOUNT #:  [de-identified]  DATE OF SERVICE:  2019      LOCATION OF SERVICE:  [address] [place] [address] [address]:  1. Right ureteral stone. 2.  Septic shock. POSTOPERATIVE DIAGNOSES:  1. Right ureteral stone. 2.  Septic shock. PROCEDURES PERFORMED:  1. Cystoscopy. 2.  Right ureteral stent placement. 3.  Blanca catheter placement. SURGEON:  Elkin Abel MD    ASSISTANT:  None. ANESTHESIA:  Monitored anesthesia care. COMPLICATIONS:  None. SPECIMENS REMOVED:  Urine from right kidney. IMPLANTS:  none    ESTIMATED BLOOD LOSS:  None. TUBES AND DRAINS:  A 6-Serbian 24 cm Double-J stent. FINDINGS:  Gross pus coming out of the right kidney as soon as the wire went past the stone. Gross pus coming out from the holes of the stent after stent placement. This was sent for culture. INDICATIONS FOR THE PROCEDURE:  The patient presented with septic shock and was found to have 5 mm right proximal ureteral calculus. DETAIL OF PROCEDURE:  After informed consent was obtained, she was brought to the OR emergently for stent placement. The risks and benefits of the procedure were discussed with her and she elected to proceed. She was in dorsal lithotomy and her perineum and genital area were prepped and draped in standard sterile fashion. A multidisciplinary time-out was performed and perioperative antibiotic was administered. A 21-Serbian scope was introduced into the bladder. Urine was cloudy and right ureteric orifice was identified. Through this, I passed the Sensor guidewire under fluoroscopic control. In the area of the proximal ureter where expected location of the stone was, I met resistance. After some maneuvering, I was able to get the wire past it and it curled appropriately in the right kidney.   Of note, the stone is radiopaque but overlying the vertebra. Also, there are gallstones in the area as noted on the CT scan. As soon as the wire went up the kidney, there was gross thick pus coming out of the right ureteric orifice. Over the wire, I placed a 6-Serbian 24-cm double-J stent with good proximal and distal curls. 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 There was thick pus coming out from the holes of the stent consistent with hydronephrotic drip. Because of her sepsis and thick pus coming out, I placed a Blanca catheter at the end. She has tolerated the procedure well and she was transported to the PACU in stable condition. She was admitted to ICU and monitored closely and Urology will continue to follow.         MD ANALILIA Barnett/[email or web address]  D:  08/23/2019 2:29  T:  08/23/2019 5:06  JOB #:  [id number]

## 2019-08-23 NOTE — PROGRESS NOTES
IR   Reviewed CT and discussed with urology. The right kidney has mild/mod hydronephrosis with minimal stranding is perinephric fat. The left kidney has no hydronephrosis. Normal wbc and no left shift. 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  It would be technically quite difficult to access with fluoroscopy and/or US because of the large body habitus and this location. Likely a \"free floating\" kidney. CT may help guide initial access but then would be dificault to maintain access to place PCN in the renal pelvis without fluoroscopy. Urology will attempt stent from below if clinically necessary and if fails may still need PCN although it may also fail due to reasons listed above.

## 2019-08-24 LAB
ANION GAP SERPL CALC-SCNC: 7 MMOL/L (ref 5–15)
BASOPHILS # BLD: 0 K/UL (ref 0–0.1)
BASOPHILS NFR BLD: 0 % (ref 0–1)
BUN SERPL-MCNC: 50 MG/DL (ref 6–20)
BUN/CREAT SERPL: 17 (ref 12–20)
CALCIUM SERPL-MCNC: 8.4 MG/DL (ref 8.5–10.1)
CHLORIDE SERPL-SCNC: 114 MMOL/L (ref 97–108)
CO2 SERPL-SCNC: 20 MMOL/L (ref 21–32)
CREAT SERPL-MCNC: 2.96 MG/DL (ref 0.55–1.02)
DIFFERENTIAL METHOD BLD: ABNORMAL
EOSINOPHIL # BLD: 0 K/UL (ref 0–0.4)
EOSINOPHIL NFR BLD: 0 % (ref 0–7)
ERYTHROCYTE [DISTWIDTH] IN BLOOD BY AUTOMATED COUNT: 14 % (ref 11.5–14.5)
GLUCOSE SERPL-MCNC: 129 MG/DL (ref 65–100)
HCT VFR BLD AUTO: 29.4 % (ref 35–47)
HGB BLD-MCNC: 9.6 G/DL (ref 11.5–16)
IMM GRANULOCYTES # BLD AUTO: 0 K/UL (ref 0–0.04)
IMM GRANULOCYTES NFR BLD AUTO: 0 % (ref 0–0.5)
LYMPHOCYTES # BLD: 1.6 K/UL (ref 0.8–3.5)
LYMPHOCYTES NFR BLD: 6 % (ref 12–49)
MCH RBC QN AUTO: 29.4 PG (ref 26–34)
MCHC RBC AUTO-ENTMCNC: 32.7 G/DL (ref 30–36.5)
MCV RBC AUTO: 89.9 FL (ref 80–99)
MONOCYTES # BLD: 1.3 K/UL (ref 0–1)
MONOCYTES NFR BLD: 5 % (ref 5–13)
NEUTS BAND NFR BLD MANUAL: 4 %
NEUTS SEG # BLD: 23.7 K/UL (ref 1.8–8)
NEUTS SEG NFR BLD: 85 % (ref 32–75)
NRBC # BLD: 0.04 K/UL (ref 0–0.01)
NRBC BLD-RTO: 0.2 PER 100 WBC
PLATELET # BLD AUTO: 85 K/UL (ref 150–400)
PMV BLD AUTO: 11 FL (ref 8.9–12.9)
POTASSIUM SERPL-SCNC: 4.3 MMOL/L (ref 3.5–5.1)
RBC # BLD AUTO: 3.27 M/UL (ref 3.8–5.2)
RBC MORPH BLD: ABNORMAL
SODIUM SERPL-SCNC: 141 MMOL/L (ref 136–145)
WBC # BLD AUTO: 26.6 K/UL (ref 3.6–11)

## 2019-08-24 PROCEDURE — 74011250636 HC RX REV CODE- 250/636: Performed by: UROLOGY

## 2019-08-24 PROCEDURE — 74011000258 HC RX REV CODE- 258: Performed by: INTERNAL MEDICINE

## 2019-08-24 PROCEDURE — 86022 PLATELET ANTIBODIES: CPT

## 2019-08-24 PROCEDURE — 36415 COLL VENOUS BLD VENIPUNCTURE: CPT

## 2019-08-24 PROCEDURE — 74011250636 HC RX REV CODE- 250/636: Performed by: INTERNAL MEDICINE

## 2019-08-24 PROCEDURE — 80048 BASIC METABOLIC PNL TOTAL CA: CPT

## 2019-08-24 PROCEDURE — 85025 COMPLETE CBC W/AUTO DIFF WBC: CPT

## 2019-08-24 PROCEDURE — 82542 COL CHROMOTOGRAPHY QUAL/QUAN: CPT

## 2019-08-24 PROCEDURE — 65270000029 HC RM PRIVATE

## 2019-08-24 PROCEDURE — 87040 BLOOD CULTURE FOR BACTERIA: CPT

## 2019-08-24 RX ORDER — VANCOMYCIN HYDROCHLORIDE
1250 EVERY 24 HOURS
Status: DISCONTINUED | OUTPATIENT
Start: 2019-08-24 | End: 2019-08-25

## 2019-08-24 RX ADMIN — VANCOMYCIN HYDROCHLORIDE 1250 MG: 10 INJECTION, POWDER, LYOPHILIZED, FOR SOLUTION INTRAVENOUS at 14:31

## 2019-08-24 RX ADMIN — SODIUM CHLORIDE 100 ML/HR: 900 INJECTION, SOLUTION INTRAVENOUS at 01:37

## 2019-08-24 RX ADMIN — PIPERACILLIN SODIUM,TAZOBACTAM SODIUM 3.38 G: 3; .375 INJECTION, POWDER, FOR SOLUTION INTRAVENOUS at 19:10

## 2019-08-24 RX ADMIN — PIPERACILLIN SODIUM,TAZOBACTAM SODIUM 3.38 G: 3; .375 INJECTION, POWDER, FOR SOLUTION INTRAVENOUS at 01:38

## 2019-08-24 RX ADMIN — HEPARIN SODIUM 7500 UNITS: 5000 INJECTION INTRAVENOUS; SUBCUTANEOUS at 06:21

## 2019-08-24 RX ADMIN — Medication 10 ML: at 14:35

## 2019-08-24 RX ADMIN — Medication 10 ML: at 06:22

## 2019-08-24 NOTE — PROGRESS NOTES
Bedside and Verbal shift change report given to [address] [place] [address] (oncoming nurse) by Yoli Horton RN (offgoing nurse). Report included the following information SBAR, Kardex, Intake/Output, MAR and Recent Results.

## 2019-08-24 NOTE — PROGRESS NOTES
Reason for Admission:   Sepsis                   RRAT Score:          5           Plan for utilizing home health:      None at this time                    Current Advanced Directive/Advance Care Plan:  Pt is a full code and has an advance directive on chart. Transition of Care Plan:  Pt is a 54 y/o [facility] female who was admitted with a diagnosis of sepsis. CM met with patient at bedside re: assessment, discharge planning. CM introduced self, role. Pt verbalized understanding. Pt verified demographic information on chart. Pt's PCP is Dr. Lavelle Garcia who she reports she last saw about 2 weeks ago. Pt lives with her  in a 1 story home with 3 steps to enter. Pt reports she is independent in ADL/IADL needs at baseline. Pt reports she does not have access to DME in the home. Pt denies use of home health or SNF prior to admission. Pt does drive, but has supportive family to assist with transportation as needed. Pt's  to transport at discharge. CM offered education re: home health. Pt verbalizede understanding, but declined home health referral at this time. Pt stated she has no further questions or needs at this time. Plan:  - return home with family assistance    Care Management Interventions  PCP Verified by CM: Yes  Last Visit to PCP: 08/09/19  Mode of Transport at Discharge: Other (see comment)(Pt's  to transport)  Transition of Care Consult (CM Consult): Discharge Planning(Pt is independent in ADL/IADL needs.  Pt has not utilized SNF or HH prior to admission)  Discharge Durable Medical Equipment: No  Physical Therapy Consult: No  Occupational Therapy Consult: No  Speech Therapy Consult: No  Current Support Network: Own Home, Lives with Spouse(Pt lives in a 1 story home with 3 TILA with her )  Confirm Follow Up Transport: Self  Plan discussed with Pt/Family/Caregiver: Yes  Discharge Location  Discharge Placement: [address], [place]  Supervisee in [address] [place] [address]  [phone]

## 2019-08-24 NOTE — PROGRESS NOTES
Bedside and Verbal shift change report given to [facility] (oncoming nurse) by Angie Cleveland RN (offgoing nurse). Report included the following information SBAR, Kardex, Intake/Output, MAR, Accordion, Recent Results and Med Rec Status.

## 2019-08-24 NOTE — PROGRESS NOTES
Bedside and Verbal shift change report given to [address] [place] [address] (oncoming nurse) by Kyra Jernigan RN (offgoing nurse). Report included the following information SBAR, Kardex, Intake/Output, MAR and Recent Results.

## 2019-08-24 NOTE — PROGRESS NOTES
Afebrile  Feeling better    Stent placed yesterday   cr 2.9  Wbc 26    pod1 right stent  Continue abx and followup as outpatient for eventual second stage ureteroscopy for stone treatment. Patient agrees to followup.   Importance and necessity for following up for eventual stone and stent removal reviewed    Exam: abd benign  Legs ok

## 2019-08-24 NOTE — PROGRESS NOTES
Pharmacy Automatic Renal Dosing Protocol - Antimicrobials    Indication for Antimicrobials: pyelonephritis, urosepsis    Current Regimen of Each Antimicrobial:  Zosyn 2.25 g IV every 12 hrs (start , day 2  Vancomycin 2000 mg IV ONCE + 1250 mg IV every 48 hrs (start , day 2    Previous Antimicrobial Therapy:  Ceftriaxone 1 g IV ONCE ()    Goal Level: Vancomycin Trough: 15 - 20 mcg/mL  (AUC: 400 - 600 mg/hr/Liter/day)     Date Dose & Interval Measured (mcg/mL) Extrapolated (mcg/mL)                       Date & time of next level:  at 1300    Significant Cultures:    blood - GNRs in 4 bottles - pending   urine: GNR - pending     Radiology / Imaging results: (X-ray, CT scan or MRI):      Paralysis, amputations, malnutrition: none noted    Labs:  Recent Labs     19  0150 19  0353 19  1950   CREA 2.96* 4.06* 4.32*   BUN 50* 53* 55*   WBC 26.6* 24.1* 4.1     Temp (24hrs), Av.9 °F (36.6 °C), Min:97.8 °F (36.6 °C), Max:97.9 °F (36.6 °C)    Creatinine Clearance (mL/min) or Dialysis: 24  ml/min    Impression/Plan:   Scr trending down. Will adjust the zosyn to 3.375 g IV q 8 hours  If we continue with the current dose of vancomycin, the estimated trough would be ~5 mcg/ml and the AUC <300. Will adjust the vancomycin to 1250 mg q 24 and it will yield an estimated trough of 17 mcg/ml and AUC of 593. Will follow blood and urine cx  Will need a repeat blood cx  Antimicrobial stop date pending. Pharmacy will follow daily and adjust medications as appropriate for renal function and/or serum levels. Thank you,  Lashell Wilson PHARMD    Recommended duration of therapy  http://[facility]/[place]/[facility]/Pharmacy/Clinical%20Companion/Duration%20of%20ABX%20therapy. docx    Renal Dosing  http://[facility]/[place]/[facility]/Pharmacy/Clinical%20Companion/Renal%[email or web address]. pdf

## 2019-08-24 NOTE — PROGRESS NOTES
Hospitalist Progress Note    NAME: Nilo Flores   :  1965   MRN:  [id number]       Assessment / Plan:  Sepsis with septic shock most likely secondary to pyelonephritis secondary to kidney stone resulting in moderate right hydronephrosis  --s/p cystoscopy w Rt ureteral stent  --urology management appreicated  --barr placed, good uop, fevers/chills/resolved  --GNR on urine cxs  --DC vanc  --follow cxs  --off pressors     Acute renal failure most likely secondary to prerenal and obstructive uropathy  as above, UOP improved  --cont IVF  --nephrology consulted  --trend Cr  --HAL with Cr of > 4 POA  --per notes, had slight elevation of Cr prior to symps (flank pain several days ago which resolved prior to admission)  --baseline Cr had been < 1.0 in last available records from a year ago    Body mass index is 40.21 kg/m². Obesity  -advise lifestyle mods, weight loss       Code Status: Full  Surrogate Decision Maker:Ayan Lucas     DVT Prophylaxis: heparin   GI Prophylaxis: not indicated     Baseline:independent      Subjective:     Chief Complaint / Reason for Physician Visit  Pt in bedside chair, no acute complaints, specifically no f/c/n    Discussed with RN events overnight. Review of Systems:  Symptom Y/N Comments  Symptom Y/N Comments   Fever/Chills n   Chest Pain n    Poor Appetite n   Edema     Cough n   Abdominal Pain n    Sputum n   Joint Pain     SOB/SHAH n   Pruritis/Rash     Nausea/vomit n   Tolerating PT/OT     Diarrhea n   Tolerating Diet     Constipation n   Other       Could NOT obtain due to:      Objective:     VITALS:   Last 24hrs VS reviewed since prior progress note.  Most recent are:  Patient Vitals for the past 24 hrs:   Temp Pulse Resp BP SpO2   19 0745 97.8 °F (36.6 °C) 74 16 108/64 96 %   19 2325 97.9 °F (36.6 °C) 87 19 105/74 96 %   19 1546 97.9 °F (36.6 °C) 83 20 104/73 96 %       Intake/Output Summary (Last 24 hours) at 2019 1417  Last data filed at 8/24/2019 0404  Gross per 24 hour   Intake 2086.67 ml   Output 2050 ml   Net 36.67 ml        PHYSICAL EXAM:  General: WD, WN. Alert, cooperative, no acute distress    EENT:  EOMI. Anicteric sclerae. MMM  Resp:  CTA bilaterally, no wheezing or rales. No accessory muscle use  CV:  Regular  rhythm,  No edema  GI:  Soft, Non distended, Non tender.  +Bowel sounds  Neurologic:  Alert and oriented X 3, normal speech,   Psych:   Good insight. Not anxious nor agitated  Skin:  No rashes. No jaundice    Reviewed most current lab test results and cultures  YES  Reviewed most current radiology test results   YES  Review and summation of old records today    NO  Reviewed patient's current orders and MAR    YES  PMH/SH reviewed - no change compared to H&P  ________________________________________________________________________  Care Plan discussed with:    Comments   Patient x    Family      RN x    Care Manager     Consultant                       x Multidiciplinary team rounds were held today with , nursing, pharmacist and clinical coordinator. Patient's plan of care was discussed; medications were reviewed and discharge planning was addressed. ________________________________________________________________________  Total NON critical care TIME:  25   Minutes    Total CRITICAL CARE TIME Spent:   Minutes non procedure based      Comments   >50% of visit spent in counseling and coordination of care x    ________________________________________________________________________  Delsie Na, DO     Procedures: see electronic medical records for all procedures/Xrays and details which were not copied into this note but were reviewed prior to creation of Plan. LABS:  I reviewed today's most current labs and imaging studies.   Pertinent labs include:  Recent Labs     08/24/19  0150 08/23/19  0353 08/22/19  1950   WBC 26.6* 24.1* 4.1   HGB 9.6* 9.9* 12.1   HCT 29.4* 29.8* 36.0   PLT 85* 94* 247 Recent Labs     08/24/19  0150 08/23/19  0353 08/22/19  1950    140 139   K 4.3 3.9 3.9   * 110* 106   CO2 20* 18* 21   * 136* 93   BUN 50* 53* 55*   CREA 2.96* 4.06* 4.32*   CA 8.4* 7.8* 9.1   ALB  --   --  2.9*   TBILI  --   --  2.8*   SGOT  --   --  49*   ALT  --   --  42       Signed: Hezekiah Scheuermann Broaddus, DO

## 2019-08-24 NOTE — PROGRESS NOTES
Name: Yonathan Del Toro   MRN: [id number]  : 1965      Assessment   :                                               Plan:  HAL  Mild non-AG metabolic acidosis  Anemia/thrombocytopenia  Sepsis  Hypotension  Mom on HD for 20 yrs from HTN     5 mm calculus in the proximal right ureter results in moderate right hydronephrosis and perinephric stranding Creatinine 1.1  and normal previous to that, 4.3 omn admit. S/p R Ureteral stent on . Good uop. Cr down to 2.9's     no postobstructive diuresis     Prerenal azotemia + ATN; a small contribution from obstructive uropathy     Decrease IVF rate to 100 ml/hr. No evidence of post obstructive diuresis so far.     Just off levo      IV ABx    Urology following    D/W pt and        Subjective:  oob in chair. Eating late lunch.  Feels better    ROS:   No nausea, no vomiting  No chest pain, no shortness of breath    Exam:  Visit Vitals  /64 (BP 1 Location: Left arm, BP Patient Position: At rest)   Pulse 74   Temp 97.8 °F (36.6 °C)   Resp 16   Ht 5' 3\" (1.6 m)   Wt 103 kg (227 lb)   SpO2 96%   BMI 40.21 kg/m²       WB/WN in NAD  AT/NC  No icterus  Clear  RRR, distant  No edema  AOx3    Current Facility-Administered Medications   Medication Dose Route Frequency Last Dose    vancomycin (VANCOCIN) 1250 mg in  ml infusion  1,250 mg IntraVENous Q24H 1,250 mg at 19 1431    piperacillin-tazobactam (ZOSYN) 3.375 g in 0.9% sodium chloride (MBP/ADV) 100 mL  3.375 g IntraVENous Q8H      NOREPINephrine (LEVOPHED) 8 mg in 5% dextrose 250mL infusion  2-100 mcg/min IntraVENous TITRATE Stopped at 19 0740    0.9% sodium chloride infusion  100 mL/hr IntraVENous CONTINUOUS 100 mL/hr at 19 0137    sodium chloride (NS) flush 5-40 mL  5-40 mL IntraVENous Q8H 10 mL at 19 1435    sodium chloride (NS) flush 5-40 mL  5-40 mL IntraVENous PRN      acetaminophen (TYLENOL) tablet 650 mg  650 mg Oral Q6H  mg at 08/23/19 1017    ondansetron (ZOFRAN) injection 4 mg  4 mg IntraVENous Q6H PRN         Labs/Data:    Lab Results   Component Value Date/Time    WBC 26.6 (H) 08/24/2019 01:50 AM    HGB 9.6 (L) 08/24/2019 01:50 AM    HCT 29.4 (L) 08/24/2019 01:50 AM    PLATELET 85 (L) 83/24/3918 01:50 AM    MCV 89.9 08/24/2019 01:50 AM       Lab Results   Component Value Date/Time    Sodium 141 08/24/2019 01:50 AM    Potassium 4.3 08/24/2019 01:50 AM    Chloride 114 (H) 08/24/2019 01:50 AM    CO2 20 (L) 08/24/2019 01:50 AM    Anion gap 7 08/24/2019 01:50 AM    Glucose 129 (H) 08/24/2019 01:50 AM    BUN 50 (H) 08/24/2019 01:50 AM    Creatinine 2.96 (H) 08/24/2019 01:50 AM    BUN/Creatinine ratio 17 08/24/2019 01:50 AM    GFR est AA 20 (L) 08/24/2019 01:50 AM    GFR est non-AA 16 (L) 08/24/2019 01:50 AM    Calcium 8.4 (L) 08/24/2019 01:50 AM       Wt Readings from Last 3 Encounters:   08/22/19 103 kg (227 lb)   08/09/19 102.5 kg (226 lb)   08/09/18 96.2 kg (212 lb)         Intake/Output Summary (Last 24 hours) at 8/24/2019 1603  Last data filed at 8/24/2019 0404  Gross per 24 hour   Intake 2086.67 ml   Output 1500 ml   Net 586.67 ml       Patient seen and examined. Chart reviewed. Labs, data and other pertinent notes reviewed in last 24 hrs.     PMH/SH/FH reviewed and unchanged compared to H&P    Elmer Bee MD

## 2019-08-25 LAB
ANION GAP SERPL CALC-SCNC: 9 MMOL/L (ref 5–15)
BACTERIA SPEC CULT: ABNORMAL
BASOPHILS # BLD: 0 K/UL (ref 0–0.1)
BASOPHILS NFR BLD: 0 % (ref 0–1)
BUN SERPL-MCNC: 44 MG/DL (ref 6–20)
BUN/CREAT SERPL: 20 (ref 12–20)
CALCIUM SERPL-MCNC: 8.7 MG/DL (ref 8.5–10.1)
CC UR VC: ABNORMAL
CHLORIDE SERPL-SCNC: 114 MMOL/L (ref 97–108)
CO2 SERPL-SCNC: 19 MMOL/L (ref 21–32)
CREAT SERPL-MCNC: 2.22 MG/DL (ref 0.55–1.02)
DIFFERENTIAL METHOD BLD: ABNORMAL
EOSINOPHIL # BLD: 0 K/UL (ref 0–0.4)
EOSINOPHIL NFR BLD: 0 % (ref 0–7)
ERYTHROCYTE [DISTWIDTH] IN BLOOD BY AUTOMATED COUNT: 14.2 % (ref 11.5–14.5)
GLUCOSE SERPL-MCNC: 87 MG/DL (ref 65–100)
HCT VFR BLD AUTO: 30.5 % (ref 35–47)
HGB BLD-MCNC: 10.2 G/DL (ref 11.5–16)
IMM GRANULOCYTES # BLD AUTO: 0 K/UL (ref 0–0.04)
IMM GRANULOCYTES NFR BLD AUTO: 0 % (ref 0–0.5)
LYMPHOCYTES # BLD: 2.8 K/UL (ref 0.8–3.5)
LYMPHOCYTES NFR BLD: 12 % (ref 12–49)
MCH RBC QN AUTO: 29.6 PG (ref 26–34)
MCHC RBC AUTO-ENTMCNC: 33.4 G/DL (ref 30–36.5)
MCV RBC AUTO: 88.4 FL (ref 80–99)
MONOCYTES # BLD: 0.5 K/UL (ref 0–1)
MONOCYTES NFR BLD: 2 % (ref 5–13)
NEUTS SEG # BLD: 19.9 K/UL (ref 1.8–8)
NEUTS SEG NFR BLD: 86 % (ref 32–75)
NRBC # BLD: 0 K/UL (ref 0–0.01)
NRBC BLD-RTO: 0 PER 100 WBC
PLATELET # BLD AUTO: 97 K/UL (ref 150–400)
PMV BLD AUTO: 11 FL (ref 8.9–12.9)
POTASSIUM SERPL-SCNC: 4.6 MMOL/L (ref 3.5–5.1)
RBC # BLD AUTO: 3.45 M/UL (ref 3.8–5.2)
RBC MORPH BLD: ABNORMAL
SERVICE CMNT-IMP: ABNORMAL
SERVICE CMNT-IMP: ABNORMAL
SODIUM SERPL-SCNC: 142 MMOL/L (ref 136–145)
WBC # BLD AUTO: 23.2 K/UL (ref 3.6–11)

## 2019-08-25 PROCEDURE — 80048 BASIC METABOLIC PNL TOTAL CA: CPT

## 2019-08-25 PROCEDURE — 36415 COLL VENOUS BLD VENIPUNCTURE: CPT

## 2019-08-25 PROCEDURE — 65270000029 HC RM PRIVATE

## 2019-08-25 PROCEDURE — 74011250636 HC RX REV CODE- 250/636: Performed by: INTERNAL MEDICINE

## 2019-08-25 PROCEDURE — 94760 N-INVAS EAR/PLS OXIMETRY 1: CPT

## 2019-08-25 PROCEDURE — 85025 COMPLETE CBC W/AUTO DIFF WBC: CPT

## 2019-08-25 PROCEDURE — 74011250637 HC RX REV CODE- 250/637: Performed by: UROLOGY

## 2019-08-25 PROCEDURE — 74011000258 HC RX REV CODE- 258: Performed by: INTERNAL MEDICINE

## 2019-08-25 RX ADMIN — Medication 10 ML: at 03:06

## 2019-08-25 RX ADMIN — Medication 10 ML: at 06:07

## 2019-08-25 RX ADMIN — ACETAMINOPHEN 650 MG: 325 TABLET ORAL at 03:27

## 2019-08-25 RX ADMIN — PIPERACILLIN SODIUM,TAZOBACTAM SODIUM 3.38 G: 3; .375 INJECTION, POWDER, FOR SOLUTION INTRAVENOUS at 03:00

## 2019-08-25 RX ADMIN — Medication 10 ML: at 19:12

## 2019-08-25 RX ADMIN — Medication 10 ML: at 23:11

## 2019-08-25 RX ADMIN — PIPERACILLIN SODIUM,TAZOBACTAM SODIUM 3.38 G: 3; .375 INJECTION, POWDER, FOR SOLUTION INTRAVENOUS at 12:20

## 2019-08-25 RX ADMIN — CEFTRIAXONE 1 G: 1 INJECTION, POWDER, FOR SOLUTION INTRAMUSCULAR; INTRAVENOUS at 19:11

## 2019-08-25 NOTE — PROGRESS NOTES
Bedside and Verbal shift change report given to Js Mosqueda (oncoming nurse) by Howard Hinds RN (offgoing nurse). Report included the following information SBAR, Kardex, Intake/Output, MAR, Accordion, Recent Results and Med Rec Status.

## 2019-08-25 NOTE — PROGRESS NOTES
Hospitalist Progress Note    NAME: Mitch Hurt   :  1965   MRN:  [id number]       Assessment / Plan:  Sepsis with septic shock most likely secondary to pyelonephritis secondary to kidney stone resulting in moderate right hydronephrosis  --s/p cystoscopy w Rt ureteral stent  --urology management appreicated  --barr placed, good uop, fevers/chills/resolved  --GNR on urine cxs  --DC vanc  --follow cxs  --off pressors     Acute renal failure most likely secondary to prerenal and obstructive uropathy  as above, UOP improved  --cont IVF  --nephrology consulted  --Cr trending dusty  --good UO   --HAL with Cr of > 4 POA  --per notes, had slight elevation of Cr prior to symps (flank pain several days ago which resolved prior to admission)  --baseline Cr had been < 1.0 in last available records from a year ago    Body mass index is 40.21 kg/m². Obesity  -advise lifestyle mods, weight loss       Code Status: Full  Surrogate Decision Maker:Ayan Lucas     DVT Prophylaxis: heparin   GI Prophylaxis: not indicated     Baseline:independent      Subjective:     Chief Complaint / Reason for Physician Visit  Sitting in chair, no current complaints    Discussed with RN events overnight. Review of Systems:  Symptom Y/N Comments  Symptom Y/N Comments   Fever/Chills n   Chest Pain n    Poor Appetite n   Edema     Cough n   Abdominal Pain n    Sputum n   Joint Pain     SOB/SHAH n   Pruritis/Rash     Nausea/vomit n   Tolerating PT/OT     Diarrhea n   Tolerating Diet     Constipation n   Other       Could NOT obtain due to:      Objective:     VITALS:   Last 24hrs VS reviewed since prior progress note.  Most recent are:  Patient Vitals for the past 24 hrs:   Temp Pulse Resp BP SpO2   19 0832 98.2 °F (36.8 °C) 75 16 123/76 97 %   19 0008 98.9 °F (37.2 °C) 70 18 108/74 98 %   19 1733 98.3 °F (36.8 °C) 72 18 113/71 99 %       Intake/Output Summary (Last 24 hours) at 2019 1123  Last data filed at 8/25/2019 0732  Gross per 24 hour   Intake 1823.33 ml   Output 2450 ml   Net -626.67 ml        PHYSICAL EXAM:  General: WD, WN. Alert, cooperative, no acute distress    EENT:  EOMI. Anicteric sclerae. MMM  Resp:  CTA bilaterally, no wheezing or rales. No accessory muscle use  CV:  Regular  rhythm,  No edema  GI:  Soft, Non distended, Non tender.  +Bowel sounds  Neurologic:  Alert and oriented X 3, normal speech,   Psych:   Good insight. Not anxious nor agitated  Skin:  No rashes. No jaundice    Reviewed most current lab test results and cultures  YES  Reviewed most current radiology test results   YES  Review and summation of old records today    NO  Reviewed patient's current orders and MAR    YES  PMH/SH reviewed - no change compared to H&P  ________________________________________________________________________  Care Plan discussed with:    Comments   Patient x    Family      RN x    Care Manager     Consultant                       x Multidiciplinary team rounds were held today with , nursing, pharmacist and clinical coordinator. Patient's plan of care was discussed; medications were reviewed and discharge planning was addressed. ________________________________________________________________________  Total NON critical care TIME:  25   Minutes    Total CRITICAL CARE TIME Spent:   Minutes non procedure based      Comments   >50% of visit spent in counseling and coordination of care x    ________________________________________________________________________  Lam Tate DO     Procedures: see electronic medical records for all procedures/Xrays and details which were not copied into this note but were reviewed prior to creation of Plan. LABS:  I reviewed today's most current labs and imaging studies.   Pertinent labs include:  Recent Labs     08/25/19  0314 08/24/19  0150 08/23/19  0353   WBC 23.2* 26.6* 24.1*   HGB 10.2* 9.6* 9.9*   HCT 30.5* 29.4* 29.8*   PLT 97* 85* 94* Recent Labs     08/25/19  0314 08/24/19  0150 08/23/19  0353 08/22/19  1950    141 140 139   K 4.6 4.3 3.9 3.9   * 114* 110* 106   CO2 19* 20* 18* 21   GLU 87 129* 136* 93   BUN 44* 50* 53* 55*   CREA 2.22* 2.96* 4.06* 4.32*   CA 8.7 8.4* 7.8* 9.1   ALB  --   --   --  2.9*   TBILI  --   --   --  2.8*   SGOT  --   --   --  49*   ALT  --   --   --  42       Signed: Nonda Sessions Lisette, DO

## 2019-08-25 NOTE — PROGRESS NOTES
Name: Brenna Smith   MRN: [id number]  : 1965      Assessment   :                                               Plan:  HAL  Mild non-AG metabolic acidosis  Anemia/thrombocytopenia  Sepsis  Hypotension  Mom on HD for 20 yrs from HTN     5 mm calculus in the proximal right ureter results in moderate right hydronephrosis and perinephric stranding Creatinine 1.1  and normal previous to that, 4.3 on admit. S/p R Ureteral stent on . Good uop. Cr down to 2.9's>>2.2s today     no postobstructive diuresis     Prerenal azotemia + ATN; a small contribution from obstructive uropathy     Decrease IVF rate to 50 ml/hr and wean off of by tomm if oral intake is adequate and BP stable      IV ABx    Needs outpt urology f/u for 2nd staged ureteroscopy for residual stone Rx. Blanca per Urology    D/W pt        Subjective:  Feeling better.  Taking PO. BP adequate    ROS:   No nausea, no vomiting  No chest pain, no shortness of breath    Exam:  Visit Vitals  /69 (BP 1 Location: Left arm, BP Patient Position: At rest)   Pulse 72   Temp 98.1 °F (36.7 °C)   Resp 16   Ht 5' 3\" (1.6 m)   Wt 103 kg (227 lb)   SpO2 95%   BMI 40.21 kg/m²       WB/WN in NAD  AT/NC  No icterus  No edema  AOx3    Current Facility-Administered Medications   Medication Dose Route Frequency Last Dose    cefTRIAXone (ROCEPHIN) 1 g in 0.9% sodium chloride (MBP/ADV) 50 mL  1 g IntraVENous Q24H      NOREPINephrine (LEVOPHED) 8 mg in 5% dextrose 250mL infusion  2-100 mcg/min IntraVENous TITRATE Stopped at 19 0740    0.9% sodium chloride infusion  50 mL/hr IntraVENous CONTINUOUS 100 mL/hr at 19 0137    sodium chloride (NS) flush 5-40 mL  5-40 mL IntraVENous Q8H 10 mL at 19 0607    sodium chloride (NS) flush 5-40 mL  5-40 mL IntraVENous PRN      acetaminophen (TYLENOL) tablet 650 mg  650 mg Oral Q6H  mg at 08/25/19 0327    ondansetron (ZOFRAN) injection 4 mg  4 mg IntraVENous Q6H PRN         Labs/Data:    Lab Results   Component Value Date/Time    WBC 23.2 (H) 08/25/2019 03:14 AM    HGB 10.2 (L) 08/25/2019 03:14 AM    HCT 30.5 (L) 08/25/2019 03:14 AM    PLATELET 97 (L) 36/56/4997 03:14 AM    MCV 88.4 08/25/2019 03:14 AM       Lab Results   Component Value Date/Time    Sodium 142 08/25/2019 03:14 AM    Potassium 4.6 08/25/2019 03:14 AM    Chloride 114 (H) 08/25/2019 03:14 AM    CO2 19 (L) 08/25/2019 03:14 AM    Anion gap 9 08/25/2019 03:14 AM    Glucose 87 08/25/2019 03:14 AM    BUN 44 (H) 08/25/2019 03:14 AM    Creatinine 2.22 (H) 08/25/2019 03:14 AM    BUN/Creatinine ratio 20 08/25/2019 03:14 AM    GFR est AA 28 (L) 08/25/2019 03:14 AM    GFR est non-AA 23 (L) 08/25/2019 03:14 AM    Calcium 8.7 08/25/2019 03:14 AM       Wt Readings from Last 3 Encounters:   08/22/19 103 kg (227 lb)   08/09/19 102.5 kg (226 lb)   08/09/18 96.2 kg (212 lb)         Intake/Output Summary (Last 24 hours) at 8/25/2019 1627  Last data filed at 8/25/2019 1240  Gross per 24 hour   Intake    Output 3225 ml   Net -3225 ml       Patient seen and examined. Chart reviewed. Labs, data and other pertinent notes reviewed in last 24 hrs.     PMH/SH/FH reviewed and unchanged compared to H&P    Joaquín King MD

## 2019-08-26 VITALS
HEIGHT: 63 IN | OXYGEN SATURATION: 98 % | HEART RATE: 72 BPM | SYSTOLIC BLOOD PRESSURE: 118 MMHG | BODY MASS INDEX: 40.22 KG/M2 | RESPIRATION RATE: 16 BRPM | DIASTOLIC BLOOD PRESSURE: 78 MMHG | TEMPERATURE: 98.1 F | WEIGHT: 227 LBS

## 2019-08-26 LAB
ANION GAP SERPL CALC-SCNC: 9 MMOL/L (ref 5–15)
BASOPHILS # BLD: 0 K/UL (ref 0–0.1)
BASOPHILS NFR BLD: 0 % (ref 0–1)
BUN SERPL-MCNC: 37 MG/DL (ref 6–20)
BUN/CREAT SERPL: 19 (ref 12–20)
CALCIUM SERPL-MCNC: 8.7 MG/DL (ref 8.5–10.1)
CHLORIDE SERPL-SCNC: 112 MMOL/L (ref 97–108)
CO2 SERPL-SCNC: 19 MMOL/L (ref 21–32)
CREAT SERPL-MCNC: 1.94 MG/DL (ref 0.55–1.02)
DIFFERENTIAL METHOD BLD: ABNORMAL
EOSINOPHIL # BLD: 0.2 K/UL (ref 0–0.4)
EOSINOPHIL NFR BLD: 1 % (ref 0–7)
ERYTHROCYTE [DISTWIDTH] IN BLOOD BY AUTOMATED COUNT: 13.9 % (ref 11.5–14.5)
GLUCOSE SERPL-MCNC: 81 MG/DL (ref 65–100)
HCT VFR BLD AUTO: 32.1 % (ref 35–47)
HGB BLD-MCNC: 10.3 G/DL (ref 11.5–16)
IMM GRANULOCYTES # BLD AUTO: 0 K/UL (ref 0–0.04)
IMM GRANULOCYTES NFR BLD AUTO: 0 % (ref 0–0.5)
LYMPHOCYTES # BLD: 3.3 K/UL (ref 0.8–3.5)
LYMPHOCYTES NFR BLD: 14 % (ref 12–49)
MCH RBC QN AUTO: 28.9 PG (ref 26–34)
MCHC RBC AUTO-ENTMCNC: 32.1 G/DL (ref 30–36.5)
MCV RBC AUTO: 90.2 FL (ref 80–99)
METAMYELOCYTES NFR BLD MANUAL: 1 %
MONOCYTES # BLD: 0.7 K/UL (ref 0–1)
MONOCYTES NFR BLD: 3 % (ref 5–13)
MYELOCYTES NFR BLD MANUAL: 1 %
NEUTS BAND NFR BLD MANUAL: 5 %
NEUTS SEG # BLD: 19.1 K/UL (ref 1.8–8)
NEUTS SEG NFR BLD: 75 % (ref 32–75)
NRBC # BLD: 0.02 K/UL (ref 0–0.01)
NRBC BLD-RTO: 0.1 PER 100 WBC
PF4 HEPARIN CMPLX AB SER-ACNC: 0.22 OD (ref 0–0.4)
PLATELET # BLD AUTO: 118 K/UL (ref 150–400)
PMV BLD AUTO: 11.4 FL (ref 8.9–12.9)
POTASSIUM SERPL-SCNC: 3.9 MMOL/L (ref 3.5–5.1)
RBC # BLD AUTO: 3.56 M/UL (ref 3.8–5.2)
RBC MORPH BLD: ABNORMAL
SODIUM SERPL-SCNC: 140 MMOL/L (ref 136–145)
WBC # BLD AUTO: 23.9 K/UL (ref 3.6–11)

## 2019-08-26 PROCEDURE — 80048 BASIC METABOLIC PNL TOTAL CA: CPT

## 2019-08-26 PROCEDURE — 85025 COMPLETE CBC W/AUTO DIFF WBC: CPT

## 2019-08-26 PROCEDURE — 36415 COLL VENOUS BLD VENIPUNCTURE: CPT

## 2019-08-26 PROCEDURE — 74011250637 HC RX REV CODE- 250/637: Performed by: UROLOGY

## 2019-08-26 RX ORDER — CEPHALEXIN 500 MG/1
500 CAPSULE ORAL 2 TIMES DAILY
Qty: 20 CAP | Refills: 0 | Status: SHIPPED | OUTPATIENT
Start: 2019-08-26 | End: 2019-09-05

## 2019-08-26 RX ADMIN — Medication 10 ML: at 05:09

## 2019-08-26 RX ADMIN — ACETAMINOPHEN 650 MG: 325 TABLET ORAL at 03:18

## 2019-08-26 NOTE — ROUTINE PROCESS
PCP [facility] apt scheduled with Dr. Marjorie Schultz on 9/10/19(earliest avail apt) at 11:00AM.  [facility] visit scheduled for 8/28/19 at [facility] will contact the patient for additional information and to confirm visit.   Apts added to AVS

## 2019-08-26 NOTE — PROGRESS NOTES
0930- Removed barr; will wait for patient to void then bladder scan. Discharge orders in; Pt states her ride can be here at 3:00pm today. Pt voided and was PVB scan showed 127 mL in bladder. Will proceed with discharge. Pt discharged home via family transportation. Discharge instructions explained and scripts e-scribed to pt pharmacy. IV removed.

## 2019-08-26 NOTE — PROGRESS NOTES
All in agreement with d/c to home this morning. Please ask pt to have transport come at [address] [place] [address] if possible.   Thanks

## 2019-08-26 NOTE — PROGRESS NOTES
Bedside and Verbal shift change report given to Onelia Kohler (oncoming nurse) by Luna Alonso RN (offgoing nurse). Report included the following information SBAR, Kardex, Intake/Output, MAR, Accordion, Recent Results and Med Rec Status.

## 2019-08-26 NOTE — DISCHARGE SUMMARY
Hospitalist Discharge Summary     Patient ID:  Reuel Cranker  [id number]  53 y.o.  1965 8/22/2019    PCP on record: Chirstianne Fortune MD    Admit date: 8/22/2019  Discharge date and time: 8/26/2019    DISCHARGE DIAGNOSIS:    See below    CONSULTATIONS:  IP CONSULT TO HOSPITALIST  IP CONSULT TO NEPHROLOGY    Excerpted HPI from H&P of Hadley Truong MD:  53years old female from home with no past medical history presented to the hospital for evaluation of generalized weakness associated with fever and chills started today, right flank pain 3 days ago, patient denies any shortness of breath any chest pain, CT abdomen was done and show right proximal ureteral stone with hydronephrosis. ______________________________________________________________________  DISCHARGE SUMMARY/HOSPITAL COURSE:  for full details see H&P, daily progress notes, labs, consult notes. Sepsis with septic shock most likely secondary to pyelonephritis secondary to kidney stone resulting in moderate right hydronephrosis  --s/p cystoscopy w Rt ureteral stent  --urology management appreicated  --barr placed, good uop, fevers/chills/resolved  --urine cx + E coli  --DC vanc, complete Keflex and OP follow-up with urology  --off pressors     Acute renal failure most likely secondary to prerenal and obstructive uropathy  as above, UOP improved  --cont IVF  --nephrology consulted  --Cr trending dusty  --good UO   --HAL with Cr of > 4 POA  --per notes, had slight elevation of Cr prior to symps (flank pain several days ago which resolved prior to admission)  --baseline Cr had been < 1.0 in last available records from a year ago     Body mass index is 40.21 kg/m². Obesity  -advise lifestyle mods, weight loss     _______________________________________________________________________  Patient seen and examined by me on discharge day. Pertinent Findings:  Gen:    Not in distress  Chest: Clear lungs  CVS:   Regular rhythm.   No edema  Abd:  Soft, not distended, not tender  Neuro:  Alert, oriented x 3  _______________________________________________________________________  DISCHARGE MEDICATIONS:   Current Discharge Medication List      START taking these medications    Details   cephALEXin (KEFLEX) 500 mg capsule Take 1 Cap by mouth two (2) times a day for 10 days. Qty: 20 Cap, Refills: 0               Patient Follow Up Instructions:    Activity: Activity as tolerated  Diet: Resume previous diet    Follow-up Information     Follow up With Specialties Details Why Contact Cristina Perez MD Internal Medicine   [address] [facility] [address]  [facility]  [phone]          ________________________________________________________________    Risk of deterioration: Moderate    Condition at Discharge:  Stable  __________________________________________________________________    Disposition  Home with family, no needs    ____________________________________________________________________    Code Status: Full Code  ___________________________________________________________________      Total time in minutes spent coordinating this discharge (includes going over instructions, follow-up, prescriptions, and preparing report for sign off to her PCP) :  35 minutes    Signed:  Terri Avalos DO

## 2019-08-26 NOTE — PROGRESS NOTES
Bedside and Verbal shift change report given to [address] [place] [address] (oncoming nurse) by Shannon Alvarado RN (offgoing nurse). Report included the following information SBAR, Kardex, Intake/Output, MAR and Recent Results.

## 2019-08-27 ENCOUNTER — PATIENT OUTREACH (OUTPATIENT)
Dept: INTERNAL MEDICINE CLINIC | Age: 54
End: 2019-08-27

## 2019-08-27 NOTE — PROGRESS NOTES
RE: Urine Results   Received: Today   Message Contents   Counselor, Niesha Harris RN; Kg Owen MD             Per Dr Sivakumar Mackenzie the patient is on the correct medication. German Breath    Previous Messages      ----- Message -----   From: Lupillo Akins RN   Sent: 8/27/2019  12:16 PM EDT   To: Jacqueilne Lozano MD   Subject: Urine Results                                     IP Urine Culture results reviewed by this NN. Urine Culture collected 8/23/19 positive for E. Coli (final resulted on 8/25/19); discharged on Keflex 500 mg BID x 10 days. Urine C&S report reviewed by this NN. Routing message to PCP for further review of results, ordered antibiotic therapy.

## 2019-08-27 NOTE — PROGRESS NOTES
Hospital Discharge Follow-Up      Date/Time:  2019 12:05 PM    Patient was admitted to [facility] on 19 and discharged on 19 for c/o fever, chills, diarrhea x 4 days. The physician discharge summary was not available at the time of outreach. Patient was contacted within 1 business days of discharge. Top Challenges reviewed with the provider   - Urine Culture positive for E. Coli (final resulted on 19); discharged on Keflex 500 mg BID x 10 days. C&S report reviewed by this NN; staff message communication to PCP for further review of results and ordered antibiotic regimen. Per PCP, patient is on appropriate antibiotic therapy for treatment of UTI, patient to complete Keflex as ordered. - pt reports office visit follow-up is scheduled with Urology 19    - low albumin - 2. 9(L) on admit  - AST elevated on admission - 49(H)  - total bilirubin elevated on admission - 2. 8(H)  - hgb 10. 3(L) at discharge; compare to admission result of 12. 1(wnl), however note that result of 10. 3(L) at discharge is improved/trended up from result on  of 9. 6(L)    - creatinine at discharge 1.94(H)    - WBC elevated at discharge - 23. 9(H); improved compared to result on  of 26.6. Elevated from admission result of 4. 1(wnl)      - [facility] post-acute visit scheduled for 19 291p-392p         Method of communication with provider :chart routing, staff message    Inpatient RRAT score: 5  Was this a readmission? no   Patient stated reason for the readmission: n/a    Nurse Navigator (NN) contacted the patient by telephone to perform post hospital discharge assessment. Verified name and  with patient as identifiers. Provided introduction to self, and explanation of the Nurse Navigator role. Reviewed discharge instructions and red flags with patient who verbalized understanding.  Patient given an opportunity to ask questions and does not have any further questions or concerns at this time. The patient agrees to contact the PCP office for questions related to their healthcare. NN provided contact information for future reference. Disease Specific:   N/A    Sepsis Note     Most recent vital signs:   Vitals 8/26/2019   Blood Pressure 118/78   Pulse 72   Temp 98.1   Resp 16   Height    Weight    SpO2 98   BSA        Source of Infection:  pyelonephritis     Antibiotic prescribed at discharge: Keflex Length of remaining treatment: 10 days  Are you taking your antibiotic as prescribed? yes     Infectious Disease Consult during admission: no     In the last 24 hour have you experienced; Fever no    Low body temperature no    Chills or shaking no    Sweating no    Fast heart rate no    Fast breathing no    Dizziness/lightheadedness no    Confusion or unusual change in mental status no    Diarrhea no    Nausea no    Vomiting no    Shortness of breath or difficulty breathing no    Less urine output no    Cold, clammy, and pale skin no     Skin rash or skin color changes no     If the patients has two or more symptoms present notify the primary care provider of the concern for sepsis. Summary of patient's top problems:  1. Sepsis with septic shock most likely secondary to pyelonephritis secondary to kidney stone resulting in moderate right hydronephrosis: Urology consulted. S/p cystoscopy with right ureteral stent 8/23/19; pt to follow-up with Urology as outpatient for eventual stone and stent removal reviewed. Required vasopressors this admission. Per most recent IP IM documentation (8/25/19), GNR on urine cxs, follow cultures; NN reviewed urine culture results today, culture positive for E. Coli (final resulted 8/25/19), patient was discharged on Oral Keflex. No IP documentation available at this time to confirm that final urine culture results were reviewed by IP Team; message has been routed to PCP for review of urine culture results as well as ordered antibiotic regimen.  Lactic Acid on admit 3.15(H); trended down to result of 1.0 (wnl) on 8/23/19. Preliminary blood culture results (collected 8/24/19) no growth. Influenza negative. 2. Acute renal failure most likely secondary to prerenal and obstructive uropathy: Nephrology consulted. Creatinine at discharge 1.94(H); trended down from result on 8/22/19 of 4.32(H). Per IP Team, baseline Cr had been < 1.0 in last available records from a year ago. Home Health orders at discharge: patient refused  [address]: n/a  Date of initial visit: [facility] ordered/company: none  Durable Medical Equipment received: n/a    Barriers to care? none reported/identified at this time    Advance Care Planning:   Does patient have an Advance Directive:  reviewed and current     Medication(s):   New Medications at Discharge: keflex  Changed Medications at Discharge: none  Discontinued Medications at Discharge: none    Medication reconciliation was performed with patient, who verbalizes understanding of administration of home medications. There were no barriers to obtaining medications identified at this time. Referral to Pharm D needed: no     Med Rec during this Call  Current Outpatient Medications   Medication Sig    cephALEXin (KEFLEX) 500 mg capsule Take 1 Cap by mouth two (2) times a day for 10 days. No current facility-administered medications for this visit. There are no discontinued medications. [facility] follow up appointment(s):   Future Appointments:  Future Appointments   Date Time Provider Jackie Lamb   9/6/2019  3:45 PM Timmy Pendleton MD [address]   9/10/2019 11:00 AM MD Therese [address]        Non-[facility] follow up appointment(s): Urology- 9/6/19 (pt reported)  [facility]:  scheduled . Per Hospital Discharge AVS, referral to [facility] for post-acute visit was completed at hospital discharge for visit request date of 8/28/19.  Pt is agreeable to [facility] visit being completed today; NN completed referral to [facility] for post-acute visit today. Of note, [facility] reports no prior referral for post-acute visit was received (from Christina Love). Goals Addressed                 This Visit's Progress       Post Hospitalization     Prevent complications post hospitalization. 8/27/2019   - denies fever, chills, decreased urine output, difficulty urinating, inability to urinate, burning/pain with urination  - urine color is described as \"yellow\" and clarity is described as clear; states, \"It's not cloudy. \" Denies foul odor. - will complete Keflex (BID x 10 days)  - has appt with Urology 9/6/19  - currently scheduled for hospital follow-up with Dr. Eduard Wild on 9/10/19; NN will route staff message to LPN  to inquire about rescheduling appt to earlier date pending appt availability  - per hospital discharge AVS, referral to [facility] for post-acute visit was completed by IP Team for visit date of 8/28/19. Patient is agreeable to [facility] visit today. - [facility] post-acute visit scheduled for today between [id number]  - reviewed sepsis red flags with patient; patient verbalizes understanding and will contact PCP office as needed to notify of any change in condition and/or new symptom onset occurs. 8/30/2019  - per PCP, pt scheduled for [facility] appt with on 9/6. Patient notified of appt information; pt accepts appointment.   - [facility] 8/[facility]; NN confirmed this with [facility] today

## 2019-08-28 LAB
SRA 100IU/ML UFH SER-ACNC: 1 % (ref 0–20)
SRA UFH SER-IMP: NORMAL
UNFRAC HEPARIN LOW DOSE: 1 % (ref 0–20)

## 2019-08-29 LAB
BACTERIA SPEC CULT: NORMAL
SERVICE CMNT-IMP: NORMAL

## 2019-09-03 ENCOUNTER — TELEPHONE (OUTPATIENT)
Dept: INTERNAL MEDICINE CLINIC | Age: 54
End: 2019-09-03

## 2019-09-03 NOTE — TELEPHONE ENCOUNTER
----- Message from Alden Rios sent at 9/3/2019 10:26 AM EDT -----  Regarding: Non-Urgent Medical Question  Contact: [phone]  Dr. Kimani Haywood,    I was recently hospitalized for Sepsis and was  prescribed cephalexin 500 mg which I have been taken since I've been home. I've noticed that I have developed a itching and rash over most of my body of which I started taking oatmeal baths and using cortizone 10; I was wondering should I continue to take this medication?     Also, a shunt was placed so that I the kidneys would function; however over the last few days I have noticed blood spotting and wanted to know if this is normal?    Panda Gifford, Patient

## 2019-09-06 ENCOUNTER — PATIENT OUTREACH (OUTPATIENT)
Dept: INTERNAL MEDICINE CLINIC | Age: 54
End: 2019-09-06

## 2019-09-06 ENCOUNTER — OFFICE VISIT (OUTPATIENT)
Dept: INTERNAL MEDICINE CLINIC | Age: 54
End: 2019-09-06

## 2019-09-06 VITALS
OXYGEN SATURATION: 99 % | DIASTOLIC BLOOD PRESSURE: 74 MMHG | RESPIRATION RATE: 18 BRPM | BODY MASS INDEX: 38.45 KG/M2 | HEIGHT: 63 IN | TEMPERATURE: 98.1 F | WEIGHT: 217 LBS | HEART RATE: 79 BPM | SYSTOLIC BLOOD PRESSURE: 114 MMHG

## 2019-09-06 DIAGNOSIS — N17.9 AKI (ACUTE KIDNEY INJURY) (HCC): ICD-10-CM

## 2019-09-06 DIAGNOSIS — Z09 HOSPITAL DISCHARGE FOLLOW-UP: Primary | ICD-10-CM

## 2019-09-06 DIAGNOSIS — N20.0 KIDNEY STONES: ICD-10-CM

## 2019-09-06 NOTE — PROGRESS NOTES
Reviewed record in preparation for visit and have obtained necessary documentation. Identified pt with two pt identifiers(name and ). Chief Complaint   Patient presents with   [facility] Follow Up       Health Maintenance Due   Topic Date Due    Pap Test  2013    Shingles Vaccine (1 of 2) 2014    Mammogram  10/03/2017    Flu Vaccine  2019       Ms. Jazmine Aguayo has a reminder for a \"due or due soon\" health maintenance. I have asked that she discuss this further with her primary care provider for follow-up on this health maintenance. Coordination of Care Questionnaire:  :     1) Have you been to an emergency room, urgent care clinic since your last visit? yes   Hospitalized since your last visit? yes             2) Have you seen or consulted any other health care providers outside of [address] [place] [address] since your last visit? no  (Include any pap smears or colon screenings in this section.)    3) In the event something were to happen to you and you were unable to speak on your behalf, do you have an Advance Directive/ Living Will in place stating your wishes? NO    Do you have an Advance Directive on file? yes    4) Are you interested in receiving information on Advance Directives? NO    Patient is accompanied by spouse I have received verbal consent from Renee Boone to discuss any/all medical information while they are present in the room.

## 2019-09-06 NOTE — PROGRESS NOTES
CC: Hospital Follow Up      HPI:    She is a 53 y.o. female who presents for evaluation of hospital follow up   Admit date: 2019  Discharge date and time: 2019     There is no discharge summary at this time    Reviewed notes    Presented with fever and chills, some right sided flank pain     CT scan showed    1. A 5 mm calculus in the proximal right ureter results in moderate right  hydronephrosis and perinephric stranding. There are also nonobstructing  bilateral renal calculi.     2. Fibroid uterus. S/p R Ureteral stent on . Had HAL and on discharge creatinine was 1.94    Patient will have ureteral stent removed on       Urine culture on  showed E coli and on keflex. Initially had rash but continue medication and completed it. Today feels well no pain     ROS:  Constitutional: negative for fevers, chills, anorexia and weight loss  Eyes:   negative for visual disturbance,  irritation  ENT:   negative for tinnitus,sore throat,nasal congestion,ear pain, sinus pain. Respiratory:  negative for cough, hemoptysis, dyspnea,wheezing  CV:   negative for chest pain, palpitations, lower extremity edema  GI:   negative for nausea, vomiting, diarrhea, abdominal pain,melena  Genitourinary: negative for frequency, dysuria, hematuria  Musculoskel: negative for myalgias, arthralgias, back pain, muscle weakness, joint pain  Neurological:  negative for headaches, dizziness, focal weakness, numbness  Psych:             Negative for depression and anxiety    History reviewed. No pertinent past medical history. Current Outpatient Medications on File Prior to Visit   Medication Sig Dispense Refill    [] cephALEXin (KEFLEX) 500 mg capsule Take 1 Cap by mouth two (2) times a day for 10 days. 20 Cap 0     No current facility-administered medications on file prior to visit.         Past Surgical History:   Procedure Laterality Date    DELIVERY   12       Family History   Problem Relation Age of Onset    Hypertension Mother         ESRD due HTN and proteinuria    Cancer Maternal Aunt         breast and lung    Diabetes Maternal Aunt     Hypertension Maternal Aunt     Heart Disease Neg Hx     Stroke Neg Hx      Reviewed and no changes     Social History     Socioeconomic History    Marital status:      Spouse name: Not on file    Number of children: Not on file    Years of education: Not on file    Highest education level: Not on file   Occupational History    Not on file   Social Needs    Financial resource strain: Not on file    Food insecurity:     Worry: Not on file     Inability: Not on file    Transportation needs:     Medical: Not on file     Non-medical: Not on file   Tobacco Use    Smoking status: Former Smoker     Last attempt to quit: 2000     Years since quittin.6    Smokeless tobacco: Never Used   Substance and Sexual Activity    Alcohol use: No    Drug use: No    Sexual activity: Yes     Partners: Male   Lifestyle    Physical activity:     Days per week: Not on file     Minutes per session: Not on file    Stress: Not on file   Relationships    Social connections:     Talks on phone: Not on file     Gets together: Not on file     Attends [employer] service: Not on file     Active member of club or organization: Not on file     Attends meetings of clubs or organizations: Not on file     Relationship status: Not on file    Intimate partner violence:     Fear of current or ex partner: Not on file     Emotionally abused: Not on file     Physically abused: Not on file     Forced sexual activity: Not on file   Other Topics Concern    Not on file   Social History Narrative    Not on file            Visit Vitals  /74 (BP 1 Location: Right arm, BP Patient Position: Sitting)   Pulse 79   Temp 98.1 °F (36.7 °C) (Oral)   Resp 18   Ht 5' 3\" (1.6 m)   Wt 217 lb (98.4 kg)   SpO2 99%   BMI 38.44 kg/m²       Physical Examination:   General - Well appearing female  HEENT - PERRL, TM no erythema/opacification, normal nasal turbinates, oropharynx no erythema or exudate, MMM  Neck - supple, no bruits, no TMG, no LAD  Pulm - clear to auscultation bilaterally  Cardio - RRR, normal S1 S2, no murmur gallops or rubs  Abd - soft, nontender, no masses, no HSM  Extrem - no edema, +2 distal pulses  Psych - normal affect, appropriate mood    Lab Results   Component Value Date/Time    WBC 23.9 (H) 08/26/2019 03:08 AM    HGB 10.3 (L) 08/26/2019 03:08 AM    HCT 32.1 (L) 08/26/2019 03:08 AM    PLATELET 165 (L) 63/14/7685 03:08 AM    MCV 90.2 08/26/2019 03:08 AM     Lab Results   Component Value Date/Time    Sodium 140 08/26/2019 03:08 AM    Potassium 3.9 08/26/2019 03:08 AM    Chloride 112 (H) 08/26/2019 03:08 AM    CO2 19 (L) 08/26/2019 03:08 AM    Anion gap 9 08/26/2019 03:08 AM    Glucose 81 08/26/2019 03:08 AM    BUN 37 (H) 08/26/2019 03:08 AM    Creatinine 1.94 (H) 08/26/2019 03:08 AM    BUN/Creatinine ratio 19 08/26/2019 03:08 AM    GFR est AA 32 (L) 08/26/2019 03:08 AM    GFR est non-AA 27 (L) 08/26/2019 03:08 AM    Calcium 8.7 08/26/2019 03:08 AM     Lab Results   Component Value Date/Time    Cholesterol, total 196 08/09/2019 09:45 AM    HDL Cholesterol 34 (L) 08/09/2019 09:45 AM    LDL, calculated 148 (H) 08/09/2019 09:45 AM    VLDL, calculated 14 08/09/2019 09:45 AM    Triglyceride 70 08/09/2019 09:45 AM     Lab Results   Component Value Date/Time    TSH 0.973 08/09/2018 09:05 AM     No results found for: PSA, Etta Abed, [id number], [id number], PSALT  Lab Results   Component Value Date/Time    Hemoglobin A1c 5.6 08/09/2019 09:45 AM     No results found for: BRITNI Bazan    Lab Results   Component Value Date/Time    ALT (SGPT) 42 08/22/2019 07:50 PM    AST (SGOT) 49 (H) 08/22/2019 07:50 PM    Alk. phosphatase 367 (H) 08/22/2019 07:50 PM    Bilirubin, total 2.8 (H) 08/22/2019 07:50 PM           Assessment/Plan:    1.  Hospital discharge follow-up  - she had sepsis, ureteral stone causing hydronephrosis with HAL - reviewed urine culture and completed keflex. She will have stent removed by urology. Feels well  - METABOLIC PANEL, COMPREHENSIVE  - CBC WITH AUTOMATED DIFF    2. HAL (acute kidney injury) ([address]  - METABOLIC PANEL, COMPREHENSIVE  - CBC WITH AUTOMATED DIFF    3. Kidney stones  - METABOLIC PANEL, COMPREHENSIVE  - CBC WITH AUTOMATED DIFF        Follow-up and Dispositions    · Return in about 3 months (around 12/6/2019) for kidney check.          Fahad Jones MD

## 2019-09-06 NOTE — PATIENT INSTRUCTIONS
Return next week no appointment needed for kidney check and blood count check    CAN ONLY TAKE TYLENOL FOR PAIN  Office Policies    Phone calls/patient messages:            Please allow up to 24 hours for someone in the office to contact you about your call or message. Be mindful your provider may be out of the office or your message may require further review. We encourage you to use [employer] for your messages as this is a faster, more efficient way to communicate with our office                         Medication Refills:            Prescription medications require 48-72 business hours to process. We encourage you to use [employer] for your refills. For controlled medications: Please allow 72 business hours to process. Certain medications may require you to  a written prescription at our office. NO narcotic/controlled medications will be prescribed after 4pm Monday through Friday or on weekends              Form/Paperwork Completion:            Please note a $25 fee may incur for all paperwork for completed by our providers. We ask that you allow 7-10 business days. Pre-payment is due prior to picking up/faxing the completed form. You may also download your forms to [employer] to have your doctor print off.

## 2019-09-06 NOTE — PROGRESS NOTES
Patient message documentation to PCP (9/3, 9/4) reviewed by this NN. NN attempted patient outreach today in order to provide appointment reminder; left non-specific voicemail requesting a return phone call to this NN.    Future Appointments:  Future Appointments   Date Time Provider Jackie Lamb   9/6/2019  3:45 PM MD Therese Conner [address]   9/10/2019 11:00 AM MD Therese Conner [address]        Last Appointment With Me:  Visit date not found     Last Appointment My Department:  8/9/2019

## 2019-09-11 ENCOUNTER — TELEPHONE (OUTPATIENT)
Dept: INTERNAL MEDICINE CLINIC | Age: 54
End: 2019-09-11

## 2019-09-11 DIAGNOSIS — N20.0 KIDNEY STONES: Primary | ICD-10-CM

## 2019-09-11 LAB
ALBUMIN SERPL-MCNC: 3.9 G/DL (ref 3.5–5.5)
ALBUMIN/GLOB SERPL: 1 {RATIO} (ref 1.2–2.2)
ALP SERPL-CCNC: 93 IU/L (ref 39–117)
ALT SERPL-CCNC: 19 IU/L (ref 0–32)
AST SERPL-CCNC: 15 IU/L (ref 0–40)
BASOPHILS # BLD AUTO: 0.1 X10E3/UL (ref 0–0.2)
BASOPHILS NFR BLD AUTO: 2 %
BILIRUB SERPL-MCNC: 0.4 MG/DL (ref 0–1.2)
BUN SERPL-MCNC: 16 MG/DL (ref 6–24)
BUN/CREAT SERPL: 12 (ref 9–23)
CALCIUM SERPL-MCNC: 9.8 MG/DL (ref 8.7–10.2)
CHLORIDE SERPL-SCNC: 106 MMOL/L (ref 96–106)
CO2 SERPL-SCNC: 24 MMOL/L (ref 20–29)
CREAT SERPL-MCNC: 1.36 MG/DL (ref 0.57–1)
EOSINOPHIL # BLD AUTO: 0.1 X10E3/UL (ref 0–0.4)
EOSINOPHIL NFR BLD AUTO: 2 %
ERYTHROCYTE [DISTWIDTH] IN BLOOD BY AUTOMATED COUNT: 12.6 % (ref 12.3–15.4)
GLOBULIN SER CALC-MCNC: 3.8 G/DL (ref 1.5–4.5)
GLUCOSE SERPL-MCNC: 89 MG/DL (ref 65–99)
HCT VFR BLD AUTO: 31.4 % (ref 34–46.6)
HGB BLD-MCNC: 10.4 G/DL (ref 11.1–15.9)
IMM GRANULOCYTES # BLD AUTO: 0 X10E3/UL (ref 0–0.1)
IMM GRANULOCYTES NFR BLD AUTO: 0 %
LYMPHOCYTES # BLD AUTO: 1.9 X10E3/UL (ref 0.7–3.1)
LYMPHOCYTES NFR BLD AUTO: 42 %
MCH RBC QN AUTO: 29.9 PG (ref 26.6–33)
MCHC RBC AUTO-ENTMCNC: 33.1 G/DL (ref 31.5–35.7)
MCV RBC AUTO: 90 FL (ref 79–97)
MONOCYTES # BLD AUTO: 0.4 X10E3/UL (ref 0.1–0.9)
MONOCYTES NFR BLD AUTO: 8 %
NEUTROPHILS # BLD AUTO: 2.1 X10E3/UL (ref 1.4–7)
NEUTROPHILS NFR BLD AUTO: 46 %
PLATELET # BLD AUTO: 258 X10E3/UL (ref 150–450)
POTASSIUM SERPL-SCNC: 4.4 MMOL/L (ref 3.5–5.2)
PROT SERPL-MCNC: 7.7 G/DL (ref 6–8.5)
RBC # BLD AUTO: 3.48 X10E6/UL (ref 3.77–5.28)
SODIUM SERPL-SCNC: 141 MMOL/L (ref 134–144)
WBC # BLD AUTO: 4.6 X10E3/UL (ref 3.4–10.8)

## 2019-09-11 RX ORDER — HYDROCODONE BITARTRATE AND ACETAMINOPHEN 5; 325 MG/1; MG/1
1 TABLET ORAL
Qty: 14 TAB | Refills: 0 | Status: SHIPPED | OUTPATIENT
Start: 2019-09-11 | End: 2019-09-11 | Stop reason: SDUPTHER

## 2019-09-11 RX ORDER — HYDROCODONE BITARTRATE AND ACETAMINOPHEN 5; 325 MG/1; MG/1
1 TABLET ORAL
Qty: 14 TAB | Refills: 0 | Status: SHIPPED | OUTPATIENT
Start: 2019-09-11 | End: 2019-09-14

## 2019-09-11 RX ORDER — KETOROLAC TROMETHAMINE 10 MG/1
10 TABLET, FILM COATED ORAL
Qty: 20 TAB | Refills: 0 | Status: SHIPPED | OUTPATIENT
Start: 2019-09-11 | End: 2019-09-16

## 2019-09-11 NOTE — TELEPHONE ENCOUNTER
Spoke with patient. Two pt identifiers confirmed. Patient advised that  has written her a prescription for a small amount of hydrocodone. Patient advised that she will need to pick this prescription up. Patient also advised to contact her urologist.  Pt verbalized understanding of information discussed w/ no further questions at this time.

## 2019-09-11 NOTE — PROGRESS NOTES
Great news your kidney function is now very close to normal. But since you recently had kidney injury I recommend that you dont take the toradol for pain.  Will have to take tylenol and tomorrow I will prescribe the patient a short supply of norco

## 2019-09-11 NOTE — TELEPHONE ENCOUNTER
Patient states she needs a call back in reference to being advised what Pain Medication she can take for Kidney Stone patient reports she is having a lot of pain with starting today. Please call to advise.  Thank you

## 2019-09-11 NOTE — TELEPHONE ENCOUNTER
Spoke with Dr. Jenny Moss, she has decided that the toradol is not the best medication for the patient.   Toradol prescription has been cancelled with the pharmacy, waiting for new prescription to print, then will call the patient

## 2019-09-11 NOTE — TELEPHONE ENCOUNTER
I prescribed toradol for her to take with food for pain.  She needs to see her urologist as soon as possible

## 2019-09-11 NOTE — PROGRESS NOTES
Spoke with patient. Two pt identifiers confirmed. Patient advised of her recent lab results per Dr. Shae Simpson. Pt verbalized understanding of information discussed w/ no further questions at this time.

## 2019-09-18 ENCOUNTER — PATIENT MESSAGE (OUTPATIENT)
Dept: INTERNAL MEDICINE CLINIC | Age: 54
End: 2019-09-18

## 2019-09-18 DIAGNOSIS — N17.9 AKI (ACUTE KIDNEY INJURY) (HCC): Primary | ICD-10-CM

## 2019-09-23 ENCOUNTER — PATIENT OUTREACH (OUTPATIENT)
Dept: INTERNAL MEDICINE CLINIC | Age: 54
End: 2019-09-23

## 2019-09-23 NOTE — PROGRESS NOTES
Goals Addressed                 This Visit's Progress       Post Hospitalization     COMPLETED: Prevent complications post hospitalization. On track     8/27/2019   - denies fever, chills, decreased urine output, difficulty urinating, inability to urinate, burning/pain with urination  - urine color is described as \"yellow\" and clarity is described as clear; states, \"It's not cloudy. \" Denies foul odor. - will complete Keflex (BID x 10 days)  - has appt with Urology 9/6/19  - currently scheduled for hospital follow-up with Dr. Luis Tomlinson on 9/10/19; NN will route staff message to LPN  to inquire about rescheduling appt to earlier date pending appt availability  - per hospital discharge AVS, referral to [facility] for post-acute visit was completed by IP Team for visit date of 8/28/19. Patient is agreeable to [facility] visit today. - [facility] post-acute visit scheduled for today between [id number]  - reviewed sepsis red flags with patient; patient verbalizes understanding and will contact PCP office as needed to notify of any change in condition and/or new symptom onset occurs. 8/30/2019  - per PCP, pt scheduled for [facility] appt with on 9/6. Patient notified of appt information; pt accepts appointment. - pt reports [facility] post-acute visit completed 8/29; NN confirmed this with [facility] today    9/23/2019  - attended [facility] appt with PCP 9/6/19; cbc, cmp ordered to be drawn following week, f/u with PCP in 3 months. - cbc, cmp collected 9/10/19. Creatinine improved 1.36 (H) compared to 1.94 (H) at hospital discharge. Recommendation by PCP not to take toradol due to recent kidney injury; take tylenol prn for pain management, prescribed short supply of Norco.  WBC 4.6 (wnl); trended down from hospital d/c result of 23. 9(H)  - \"I'm feeling pretty good. \"  - not taking Norco; has not had to take since prescription order- states, \"I didn't have to. \" Reports right side/\"right kidney\" pain is resolved. - not taking toradol  - has not required tylenol for pain  - completed Bactrim DS this morning (order date 9/16/19, x 7 days) as ordered by Urologist. Patient also reports order for Bactrim DS (x 7 days) on 9/6/19 by Urologist for treatment of UTI; therapy completed.   - attended appt with Urology 9/6/19; reports procedure completed 9/16/19 (cystoscopy-per pt) and post-op f/u and stent removal completed 9/18/19.   - 6 week f/u with Urology and ultrasound scheduled for 10/30/19  - routine 3 month f/u for kidney check has been scheduled with PCP for 12/19/19          Future Appointments:  Future Appointments   Date Time Provider Jackie Lamb   12/19/2019  1:45 PM Appa MD Cm Merrill [id number] Appointment With Me:  Visit date not found     Last Appointment My Department:  9/10/2019

## 2019-09-27 NOTE — TELEPHONE ENCOUNTER
Pato Lema 9/19/2019 8:10 AM EDT        ----- Message -----  From: Jakub Benton  Sent: 9/18/2019 12:33 PM EDT  To: [facility] [facility]  Subject: Non-Urgent Medical Question     Im writing in regard to my current medical care in ref to my kidneys not functioning at 100%. I met with the urologist and had surgery to remove the stones. ..thats working fine so far, but Im concerned that I need to do something else or see someone else to improve and or return my kidneys function to normal.    Can you advise on my next step?     Sarah Beth Sorto

## 2019-10-01 ENCOUNTER — TELEPHONE (OUTPATIENT)
Dept: INTERNAL MEDICINE CLINIC | Age: 54
End: 2019-10-01

## 2019-10-01 ENCOUNTER — PATIENT OUTREACH (OUTPATIENT)
Dept: INTERNAL MEDICINE CLINIC | Age: 54
End: 2019-10-01

## 2019-10-01 NOTE — PROGRESS NOTES
NN attempted patient outreach today in order to perform final [facility] call; lvm requesting a return phone call to this NN. Goals Addressed                 This Visit's Progress       Post Hospitalization     COMPLETED: Prevent complications post hospitalization. On track     8/27/2019   - denies fever, chills, decreased urine output, difficulty urinating, inability to urinate, burning/pain with urination  - urine color is described as \"yellow\" and clarity is described as clear; states, \"It's not cloudy. \" Denies foul odor. - will complete Keflex (BID x 10 days)  - has appt with Urology 9/6/19  - currently scheduled for hospital follow-up with Dr. Azeb García on 9/10/19; NN will route staff message to LPN  to inquire about rescheduling appt to earlier date pending appt availability  - per hospital discharge AVS, referral to [facility] for post-acute visit was completed by IP Team for visit date of 8/28/19. Patient is agreeable to [facility] visit today. - [facility] post-acute visit scheduled for today between [id number]  - reviewed sepsis red flags with patient; patient verbalizes understanding and will contact PCP office as needed to notify of any change in condition and/or new symptom onset occurs. 8/30/2019  - per PCP, pt scheduled for [facility] appt with on 9/6. Patient notified of appt information; pt accepts appointment. - pt reports [facility] post-acute visit completed 8/29; NN confirmed this with [facility] today    9/23/2019  - attended [facility] appt with PCP 9/6/19; cbc, cmp ordered to be drawn following week, f/u with PCP in 3 months. - cbc, cmp collected 9/10/19. Creatinine improved 1.36 (H) compared to 1.94 (H) at hospital discharge. Recommendation by PCP not to take toradol due to recent kidney injury; take tylenol prn for pain management, prescribed short supply of Norco.  WBC 4.6 (wnl); trended down from hospital d/c result of 23. 9(H)  - \"I'm feeling pretty good.\"  - not taking Norco; has not had to take since prescription order- states, \"I didn't have to. \" Reports right side/\"right kidney\" pain is resolved. - not taking toradol  - has not required tylenol for pain  - completed Bactrim DS this morning (order date 9/16/19, x 7 days) as ordered by Urologist. Patient also reports order for Bactrim DS (x 7 days) on 9/6/19 by Urologist for treatment of UTI; therapy completed. - attended appt with Urology 9/6/19; reports procedure completed 9/16/19 (cystoscopy-per pt) and post-op f/u and stent removal completed 9/18/19.   - 6 week f/u with Urology and ultrasound scheduled for 10/30/19  - routine 3 month f/u for kidney check has been scheduled with PCP for 12/19/19    10/1/2019  - To the best of this NN's knowledge, this patient had no additional Inpatient Hospital Admissions during 30 day [facility] period following admission to [facility] 8/22/19-8/26/19.   - [facility] period has ended. Goal Completed.                     Future Appointments:  Future Appointments   Date Time Provider Jackie Lamb   12/19/2019  1:45 PM Appa MD Therese Chan [address]        Last Appointment With Me:  Visit date not found     Last Appointment My Department:  9/10/2019

## 2019-10-02 ENCOUNTER — PATIENT OUTREACH (OUTPATIENT)
Dept: INTERNAL MEDICINE CLINIC | Age: 54
End: 2019-10-02

## 2019-10-02 NOTE — PROGRESS NOTES
NN attempted patient outreach today in order to perform final [facility] call (returned patient phone call); lvm requesting a return phone call to this NN.

## 2019-10-02 NOTE — TELEPHONE ENCOUNTER
NN returned phone call to patient today; unable to reach patient, lvm advising of returned phone call. See NN outreach encounter.

## 2019-10-09 LAB
BUN SERPL-MCNC: 14 MG/DL (ref 6–24)
BUN/CREAT SERPL: 11 (ref 9–23)
CALCIUM SERPL-MCNC: 10.2 MG/DL (ref 8.7–10.2)
CHLORIDE SERPL-SCNC: 105 MMOL/L (ref 96–106)
CO2 SERPL-SCNC: 22 MMOL/L (ref 20–29)
CREAT SERPL-MCNC: 1.24 MG/DL (ref 0.57–1)
GLUCOSE SERPL-MCNC: 70 MG/DL (ref 65–99)
POTASSIUM SERPL-SCNC: 4.5 MMOL/L (ref 3.5–5.2)
SODIUM SERPL-SCNC: 142 MMOL/L (ref 134–144)

## 2019-10-13 NOTE — TELEPHONE ENCOUNTER
Kidney function continues to improve- and closer and closer to normal. Continue to avoid NSAIDs and drink plenty of water  We will repeat the level at your follow up

## 2019-12-19 ENCOUNTER — OFFICE VISIT (OUTPATIENT)
Dept: INTERNAL MEDICINE CLINIC | Age: 54
End: 2019-12-19

## 2019-12-19 VITALS
SYSTOLIC BLOOD PRESSURE: 128 MMHG | WEIGHT: 221 LBS | HEIGHT: 63 IN | DIASTOLIC BLOOD PRESSURE: 88 MMHG | OXYGEN SATURATION: 99 % | RESPIRATION RATE: 18 BRPM | BODY MASS INDEX: 39.16 KG/M2 | HEART RATE: 78 BPM | TEMPERATURE: 97.8 F

## 2019-12-19 DIAGNOSIS — Z23 ENCOUNTER FOR IMMUNIZATION: ICD-10-CM

## 2019-12-19 DIAGNOSIS — N17.9 AKI (ACUTE KIDNEY INJURY) (HCC): Primary | ICD-10-CM

## 2019-12-19 NOTE — PATIENT INSTRUCTIONS
Schedule pap smear in the near future Office Policies Phone calls/patient messages: Please allow up to 24 hours for someone in the office to contact you about your call or message. Be mindful your provider may be out of the office or your message may require further review. We encourage you to use [employer] for your messages as this is a faster, more efficient way to communicate with our office Medication Refills: 
         
Prescription medications require 48-72 business hours to process. We encourage you to use [employer] for your refills. For controlled medications: Please allow 72 business hours to process. Certain medications may require you to  a written prescription at our office. NO narcotic/controlled medications will be prescribed after 4pm Monday through Friday or on weekends Form/Paperwork Completion: 
         
Please note a $25 fee may incur for all paperwork for completed by our providers. We ask that you allow 7-10 business days. Pre-payment is due prior to picking up/faxing the completed form. You may also download your forms to [employer] to have your doctor print off.

## 2019-12-19 NOTE — PROGRESS NOTES
Reviewed record in preparation for visit and have obtained necessary documentation. Identified pt with two pt identifiers(name and ). Chief Complaint   Patient presents with    Follow-up     kidney check       Health Maintenance Due   Topic Date Due    Pap Test  2013    Shingles Vaccine (1 of 2) 2014    Flu Vaccine  2019       Ms. Steven Gutierrez has a reminder for a \"due or due soon\" health maintenance. I have asked that she discuss this further with her primary care provider for follow-up on this health maintenance. Coordination of Care Questionnaire:  :     1) Have you been to an emergency room, urgent care clinic since your last visit? no   Hospitalized since your last visit? no             2) Have you seen or consulted any other health care providers outside of [address] [place] [address] since your last visit? no  (Include any pap smears or colon screenings in this section.)    3) In the event something were to happen to you and you were unable to speak on your behalf, do you have an Advance Directive/ Living Will in place stating your wishes? NO    Do you have an Advance Directive on file? no    4) Are you interested in receiving information on Advance Directives? NO    Patient is accompanied by  I have received verbal consent from Lizette Mercer to discuss any/all medical information while they are present in the room.

## 2019-12-19 NOTE — PROGRESS NOTES
CC: Follow-up (kidney check)      HPI:    She is a 53 y.o. female who presents for evaluation of  Follow up on kidney failure    Recall patient had admission for kidney failure due to kidney stone she had stent placed / and removed. Kidney recovering. Knows to not take NSAIDs. Overall feels well   Saw Dr Flakita Story      ROS:  10 systems reviewed and negative other than HPI    History reviewed. No pertinent past medical history. No current outpatient medications on file prior to visit. No current facility-administered medications on file prior to visit.         Past Surgical History:   Procedure Laterality Date    DELIVERY   12       Family History   Problem Relation Age of Onset    Hypertension Mother         ESRD due HTN and proteinuria    Cancer Maternal Aunt         breast and lung    Diabetes Maternal Aunt     Hypertension Maternal Aunt     Heart Disease Neg Hx     Stroke Neg Hx      Reviewed and no changes     Social History     Socioeconomic History    Marital status:      Spouse name: Not on file    Number of children: Not on file    Years of education: Not on file    Highest education level: Not on file   Occupational History    Not on file   Social Needs    Financial resource strain: Not on file    Food insecurity:     Worry: Not on file     Inability: Not on file    Transportation needs:     Medical: Not on file     Non-medical: Not on file   Tobacco Use    Smoking status: Former Smoker     Last attempt to quit: 2000     Years since quittin.9    Smokeless tobacco: Never Used   Substance and Sexual Activity    Alcohol use: No    Drug use: No    Sexual activity: Yes     Partners: Male   Lifestyle    Physical activity:     Days per week: Not on file     Minutes per session: Not on file    Stress: Not on file   Relationships    Social connections:     Talks on phone: Not on file     Gets together: Not on file     Attends Advent service: Not on file Active member of club or organization: Not on file     Attends meetings of clubs or organizations: Not on file     Relationship status: Not on file    Intimate partner violence:     Fear of current or ex partner: Not on file     Emotionally abused: Not on file     Physically abused: Not on file     Forced sexual activity: Not on file   Other Topics Concern    Not on file   Social History Narrative    Not on file            Visit Vitals  /88 (BP 1 Location: Right arm, BP Patient Position: Sitting)   Pulse 78   Temp 97.8 °F (36.6 °C) (Oral)   Resp 18   Ht 5' 3\" (1.6 m)   Wt 221 lb (100.2 kg)   SpO2 99%   BMI 39.15 kg/m²       Physical Examination:   General - Well appearing female  HEENT - PERRL, TM no erythema/opacification, normal nasal turbinates, oropharynx no erythema or exudate, MMM  Neck - supple, no bruits, no TMG, no LAD  Pulm - clear to auscultation bilaterally  Cardio - RRR, normal S1 S2, no murmur gallops or rubs  Abd - soft, nontender, no masses, no HSM  Extrem - no edema, +2 distal pulses  Psych - normal affect, appropriate mood    Lab Results   Component Value Date/Time    WBC 4.6 09/10/2019 09:00 AM    HGB 10.4 (L) 09/10/2019 09:00 AM    HCT 31.4 (L) 09/10/2019 09:00 AM    PLATELET 114 67/62/8075 09:00 AM    MCV 90 09/10/2019 09:00 AM     Lab Results   Component Value Date/Time    Sodium 142 10/08/2019 09:12 AM    Potassium 4.5 10/08/2019 09:12 AM    Chloride 105 10/08/2019 09:12 AM    CO2 22 10/08/2019 09:12 AM    Anion gap 9 08/26/2019 03:08 AM    Glucose 70 10/08/2019 09:12 AM    BUN 14 10/08/2019 09:12 AM    Creatinine 1.24 (H) 10/08/2019 09:12 AM    BUN/Creatinine ratio 11 10/08/2019 09:12 AM    GFR est AA 57 (L) 10/08/2019 09:12 AM    GFR est non-AA 49 (L) 10/08/2019 09:12 AM    Calcium 10.2 10/08/2019 09:12 AM     Lab Results   Component Value Date/Time    Cholesterol, total 196 08/09/2019 09:45 AM    HDL Cholesterol 34 (L) 08/09/2019 09:45 AM    LDL, calculated 148 (H) 08/09/2019 09:45 AM    VLDL, calculated 14 08/09/2019 09:45 AM    Triglyceride 70 08/09/2019 09:45 AM     Lab Results   Component Value Date/Time    TSH 0.973 08/09/2018 09:05 AM     No results found for: ROBBIN, Mansoor Schroeder, [id number], [id number], PSALT  Lab Results   Component Value Date/Time    Hemoglobin A1c 5.6 08/09/2019 09:45 AM     No results found for: Omega Hauser, VD3RIA    Lab Results   Component Value Date/Time    ALT (SGPT) 19 09/10/2019 09:00 AM    AST (SGOT) 15 09/10/2019 09:00 AM    Alk. phosphatase 93 09/10/2019 09:00 AM    Bilirubin, total 0.4 09/10/2019 09:00 AM           Assessment/Plan:      ICD-10-CM ICD-9-CM    1. HAL (acute kidney injury) ([facility] [address] in the setting of renal stone and UTI in the fall. Repeat CMP today  N17.9 584.9 CBC WITH AUTOMATED DIFF      METABOLIC PANEL, COMPREHENSIVE   2. Encounter for immunization Z23 V03.89 INFLUENZA VIRUS VAC QUAD,SPLIT,PRESV FREE SYRINGE IM   3.  Obesity: counseled on weight loss    Eudelia De Leon VAC/TOXOID               Corinna Garcia MD

## 2019-12-20 LAB
ALBUMIN SERPL-MCNC: 4.1 G/DL (ref 3.5–5.5)
ALBUMIN/GLOB SERPL: 1.3 {RATIO} (ref 1.2–2.2)
ALP SERPL-CCNC: 80 IU/L (ref 39–117)
ALT SERPL-CCNC: 18 IU/L (ref 0–32)
AST SERPL-CCNC: 15 IU/L (ref 0–40)
BASOPHILS # BLD AUTO: 0 X10E3/UL (ref 0–0.2)
BASOPHILS NFR BLD AUTO: 1 %
BILIRUB SERPL-MCNC: <0.2 MG/DL (ref 0–1.2)
BUN SERPL-MCNC: 19 MG/DL (ref 6–24)
BUN/CREAT SERPL: 14 (ref 9–23)
CALCIUM SERPL-MCNC: 10.3 MG/DL (ref 8.7–10.2)
CHLORIDE SERPL-SCNC: 107 MMOL/L (ref 96–106)
CO2 SERPL-SCNC: 25 MMOL/L (ref 20–29)
CREAT SERPL-MCNC: 1.39 MG/DL (ref 0.57–1)
EOSINOPHIL # BLD AUTO: 0.1 X10E3/UL (ref 0–0.4)
EOSINOPHIL NFR BLD AUTO: 2 %
ERYTHROCYTE [DISTWIDTH] IN BLOOD BY AUTOMATED COUNT: 12.5 % (ref 12.3–15.4)
GLOBULIN SER CALC-MCNC: 3.1 G/DL (ref 1.5–4.5)
GLUCOSE SERPL-MCNC: 78 MG/DL (ref 65–99)
HCT VFR BLD AUTO: 37.8 % (ref 34–46.6)
HGB BLD-MCNC: 12.3 G/DL (ref 11.1–15.9)
IMM GRANULOCYTES # BLD AUTO: 0 X10E3/UL (ref 0–0.1)
IMM GRANULOCYTES NFR BLD AUTO: 0 %
LYMPHOCYTES # BLD AUTO: 2.9 X10E3/UL (ref 0.7–3.1)
LYMPHOCYTES NFR BLD AUTO: 43 %
MCH RBC QN AUTO: 29.3 PG (ref 26.6–33)
MCHC RBC AUTO-ENTMCNC: 32.5 G/DL (ref 31.5–35.7)
MCV RBC AUTO: 90 FL (ref 79–97)
MONOCYTES # BLD AUTO: 0.6 X10E3/UL (ref 0.1–0.9)
MONOCYTES NFR BLD AUTO: 9 %
NEUTROPHILS # BLD AUTO: 3.1 X10E3/UL (ref 1.4–7)
NEUTROPHILS NFR BLD AUTO: 45 %
PLATELET # BLD AUTO: 276 X10E3/UL (ref 150–450)
POTASSIUM SERPL-SCNC: 4.5 MMOL/L (ref 3.5–5.2)
PROT SERPL-MCNC: 7.2 G/DL (ref 6–8.5)
RBC # BLD AUTO: 4.2 X10E6/UL (ref 3.77–5.28)
SODIUM SERPL-SCNC: 143 MMOL/L (ref 134–144)
WBC # BLD AUTO: 6.8 X10E3/UL (ref 3.4–10.8)

## 2020-01-02 ENCOUNTER — PATIENT MESSAGE (OUTPATIENT)
Dept: INTERNAL MEDICINE CLINIC | Age: 55
End: 2020-01-02

## 2020-07-08 ENCOUNTER — PATIENT MESSAGE (OUTPATIENT)
Dept: INTERNAL MEDICINE CLINIC | Age: 55
End: 2020-07-08

## 2020-07-08 DIAGNOSIS — Z12.39 SCREENING FOR BREAST CANCER: Primary | ICD-10-CM

## 2020-07-08 NOTE — TELEPHONE ENCOUNTER
Marge Bansal 7/8/2020 8:38 AM EDT      ----- Message -----  From: Payal Rueda  Sent: 7/8/2020 8:16 AM EDT  To: [facility] Nurse Pool  Subject: Test Results Question     On 8/28/19 I had a mammogram conducted by Dr Adriana Good @ the [facility]; As you my now his medical license has been suspended due to not misreading the results from like 18 so far mammograms that he's conducted. I'm writing to determine if that medical test was ever submitted/received to you Dr Tierney Mills from the [facility]. Most importantly, I need to know if I should have it reviewed again to ensure it was not misread and/or I need to have another mammogram--specifically where do I go from here? Thanks,  Erin Thornton.

## 2020-11-10 ENCOUNTER — OFFICE VISIT (OUTPATIENT)
Dept: SURGERY | Age: 55
End: 2020-11-10
Payer: COMMERCIAL

## 2020-11-10 VITALS
RESPIRATION RATE: 18 BRPM | WEIGHT: 229 LBS | HEART RATE: 88 BPM | TEMPERATURE: 98.8 F | OXYGEN SATURATION: 95 % | SYSTOLIC BLOOD PRESSURE: 124 MMHG | DIASTOLIC BLOOD PRESSURE: 81 MMHG | HEIGHT: 63 IN | BODY MASS INDEX: 40.57 KG/M2

## 2020-11-10 DIAGNOSIS — K80.20 CALCULUS OF GALLBLADDER WITHOUT CHOLECYSTITIS WITHOUT OBSTRUCTION: Primary | ICD-10-CM

## 2020-11-10 PROCEDURE — 99203 OFFICE O/P NEW LOW 30 MIN: CPT | Performed by: SURGERY

## 2020-11-10 NOTE — PROGRESS NOTES
1. Have you been to the ER, urgent care clinic since your last visit? Hospitalized since your last visit? No     2. Have you seen or consulted any other health care providers outside of the [address] [place] [address] since your last visit? Include any pap smears or colon screening.   No

## 2020-11-10 NOTE — PROGRESS NOTES
[facility] History and Physical    Chief Complaint: Gallstones    History of Present Illness:      Reji Mancilla is a 63 y.o. female who was kindly referred by Dr. Smitha Toney of Urology for evaluation of gallstones. The patient is followed by urology for a history of kidney stones for which she has previously required stenting and lithotripsy. The patient had an abdominal x-ray as well as a CT scan recently that showed evidence of gallstones. The patient reports she has had some discomfort in her right flank and back that occurs when she is more active and most notably in the evenings after a very active day. She denies any relationship of the discomfort with eating and has no nausea or vomiting. She has no prior history of jaundice. No history of pancreatitis. The pain that she feels is muscle soreness that resolves over the course of a few days and related to activity as stated above. The patient requested further evaluation for her gallstones given her difficulties with kidney stones in the past.    No past medical history on file.     Past Surgical History:   Procedure Laterality Date    DELIVERY   12    HX TUBAL LIGATION         Social History     Socioeconomic History    Marital status:      Spouse name: Not on file    Number of children: Not on file    Years of education: Not on file    Highest education level: Not on file   Occupational History    Not on file   Social Needs    Financial resource strain: Not on file    Food insecurity     Worry: Not on file     Inability: Not on file    Transportation needs     Medical: Not on file     Non-medical: Not on file   Tobacco Use    Smoking status: Former Smoker     Last attempt to quit: 2000     Years since quittin.8    Smokeless tobacco: Never Used   Substance and Sexual Activity    Alcohol use: Yes     Comment: \"Rarely\"    Drug use: No    Sexual activity: Yes     Partners: Male Lifestyle    Physical activity     Days per week: Not on file     Minutes per session: Not on file    Stress: Not on file   Relationships    Social connections     Talks on phone: Not on file     Gets together: Not on file     Attends [employer] service: Not on file     Active member of club or organization: Not on file     Attends meetings of clubs or organizations: Not on file     Relationship status: Not on file    Intimate partner violence     Fear of current or ex partner: Not on file     Emotionally abused: Not on file     Physically abused: Not on file     Forced sexual activity: Not on file   Other Topics Concern    Not on file   Social History Narrative    Not on file       Family History   Problem Relation Age of Onset    Hypertension Mother         ESRD due HTN and proteinuria    Cancer Maternal Aunt         breast and lung    Diabetes Maternal Aunt     Hypertension Maternal Aunt     Heart Disease Neg Hx     Stroke Neg Hx        No current outpatient medications on file. Allergies   Allergen Reactions    Shellfish Derived Hives       ROS   Constitutional: Negative  Ears, Nose, Mouth, Throat, and Face: Negative  Respiratory: Negative  Cardiovascular: Negative  Gastrointestinal: As in HPI  Genitourinary: History of kidney stones  Integument/Breast: Negative  Hematologic/Lymphatic: Negative  Behavioral/Psychiatric: Negative  Allergic/Immunologic: Negative      Physical Exam:     Visit Vitals  /81 (BP 1 Location: Left arm, BP Patient Position: Sitting)   Pulse 88   Temp 98.8 °F (37.1 °C) (Oral)   Resp 18   Ht 5' 3\" (1.6 m)   Wt 229 lb (103.9 kg)   SpO2 95%   BMI 40.57 kg/m²       General - alert and oriented, no apparent distress  HEENT - NC/AT. No scleral icterus  Pulm - CTAB, normal inspiratory effort  CV - RRR, no M/R/G  Abd - soft, NT, ND. No palpable masses or organomegaly.   Ext - warm, well perfused, no edema  Lymphatics - no cervical, supraclavicular or inguinal adenopathy noted.   Skin - supple, no rashes  Psychiatric - normal affect, good mood    Labs  None    Imaging  CT results from [facility] reviewed. Assessment:     Eyad Rossi is a 63 y.o. female with history of kidney stones who was incidentally noted to have gallstones on imaging. She is asymptomatic from these. Recommendations:     1. The patient does have gallstones noted on imaging but she appears to be completely asymptomatic from these. I discussed that typically cholecystectomy is reserved for situations where people are having symptoms related to their gallstones. I reviewed the symptoms that she should watch for and discussed those that would require urgent or emergent surgery as well. She prefers not to have surgery unless necessary secondary to symptoms which coincides with what I have recommended. I will plan to see her back in the future as needed. 30 mins of time was spent with the patient of which > 50% of the time involved face-to-face counseling of the patient regarding the proposed treatment plan.       Shaggy Harvey MD  11/10/2020    CC: MD Dr. Jaime Benavides

## 2020-11-10 NOTE — LETTER
11/10/20 Patient: Tracey Schneider YOB: 1965 Date of Visit: 11/10/2020 Georgiana Snyder MD 
[address] [address] VIA In Basket Dear Georgiana Snyder MD, Thank you for referring Ms. Abigail Duvall to [facility] for evaluation. My notes for this consultation are attached. If you have questions, please do not hesitate to call me. I look forward to following your patient along with you.  
 
 
Sincerely, 
 
Tacos Lira MD

## 2021-03-16 ENCOUNTER — TELEPHONE (OUTPATIENT)
Dept: INTERNAL MEDICINE CLINIC | Age: 56
End: 2021-03-16

## 2021-03-16 NOTE — TELEPHONE ENCOUNTER
----- Message from Vibrow sent at 3/16/2021 12:00 PM EDT -----  Regarding: Dr Angela Colon  Patient return call    Caller's first and last name and relationship (if not the patient): n/a      Best contact number(s): [phone]      Whose call is being returned: Alex Barillas      Details to clarify the request: [address]

## 2022-03-19 PROBLEM — A41.9 SEPSIS (HCC): Status: ACTIVE | Noted: 2019-08-23

## 2022-10-03 ENCOUNTER — OFFICE VISIT (OUTPATIENT)
Dept: INTERNAL MEDICINE CLINIC | Age: 57
End: 2022-10-03
Payer: COMMERCIAL

## 2022-10-03 VITALS
HEIGHT: 62 IN | WEIGHT: 237 LBS | HEART RATE: 74 BPM | DIASTOLIC BLOOD PRESSURE: 77 MMHG | SYSTOLIC BLOOD PRESSURE: 114 MMHG | TEMPERATURE: 97.8 F | RESPIRATION RATE: 16 BRPM | BODY MASS INDEX: 43.61 KG/M2 | OXYGEN SATURATION: 96 %

## 2022-10-03 DIAGNOSIS — Z12.31 ENCOUNTER FOR SCREENING MAMMOGRAM FOR MALIGNANT NEOPLASM OF BREAST: ICD-10-CM

## 2022-10-03 DIAGNOSIS — E66.09 OBESITY DUE TO EXCESS CALORIES WITHOUT SERIOUS COMORBIDITY, UNSPECIFIED CLASSIFICATION: Primary | ICD-10-CM

## 2022-10-03 DIAGNOSIS — E78.5 DYSLIPIDEMIA: ICD-10-CM

## 2022-10-03 DIAGNOSIS — Z13.1 SCREENING FOR DIABETES MELLITUS: ICD-10-CM

## 2022-10-03 DIAGNOSIS — E55.9 VITAMIN D DEFICIENCY: ICD-10-CM

## 2022-10-03 DIAGNOSIS — E66.01 OBESITY, CLASS III, BMI 40-49.9 (MORBID OBESITY) (HCC): ICD-10-CM

## 2022-10-03 PROBLEM — K80.20 GALLSTONES: Status: ACTIVE | Noted: 2022-10-03

## 2022-10-03 PROCEDURE — 99214 OFFICE O/P EST MOD 30 MIN: CPT | Performed by: INTERNAL MEDICINE

## 2022-10-03 NOTE — LETTER
10/3/2022 11:03 AM    Ms. [address] [facility]          Dear Physician,    Please fax us the most recent office notes, pap smear and mammogram results, so that we may update the patient's records for continuity of care.      Our fax number: [phone]    Patient:   Jed Herndon  1965                    Sincerely,      Vania Alcaraz MD

## 2022-10-03 NOTE — PROGRESS NOTES
Ms. Laury Krause is presenting to follow up     CC:  Establish Care  Hx of kidney stones      HPI:  34-year-old woman with a hx of obesity, gallstones, HAL due to kidney stones presenting to establish care. She generally feels well     The patient does have gallstones noted on imaging but she appears to be completely asymptomatic from these  She has seen a surgeon and at this time observation only  Recall patient had admission for kidney failure due to kidney stone she had stent placed / and removed. Kidney recovered Knows to not take NSAIDs. Overall feels well   Sees Dr Liana Garcia yearly. Colonoscopy 2018 and normal reviewed today  She is due for mammogram and Pap smear    Review of systems:  Constitutional: negative for fever, chills, weight loss, night sweats   Eyes : negative for vision changes, eye pain and discharge  Nose and Throat: negative for tinnitus, sore throat   Cardiovascular: negative for chest pain, palpitations and shortness of breath  Respiratory: negative for shortness of breath, cough and wheezing   Gastroinstestinal: negative for abdominal pain, nausea, vomiting, diarrhea, constipation, and blood in the stool  Musculoskeletal: negative for back ache and joint ache   Genitourinary: negative for dysuria, nocturia, polyuria and hematuria   Neurologic: Negative for focal weakness, numbness or incoordination  Skin: negative for rash, pruritus  Hematologic: negative for easy bruising      History reviewed. No pertinent past medical history. Past Surgical History:   Procedure Laterality Date    DELIVERY   12    HX TUBAL LIGATION         Allergies   Allergen Reactions    Shellfish Derived Hives       No current outpatient medications on file prior to visit. No current facility-administered medications on file prior to visit. family history includes Cancer in her maternal aunt; Diabetes in her maternal aunt; Hypertension in her maternal aunt and mother.     Social History     Socioeconomic History    Marital status: LEGALLY      Spouse name: Not on file    Number of children: Not on file    Years of education: Not on file    Highest education level: Not on file   Occupational History    Not on file   Tobacco Use    Smoking status: Former     Types: Cigarettes     Quit date: 2000     Years since quittin.7    Smokeless tobacco: Never   Vaping Use    Vaping Use: Never used   Substance and Sexual Activity    Alcohol use: Yes     Comment: \"Rarely\"    Drug use: No    Sexual activity: Yes     Partners: Male   Other Topics Concern    Not on file   Social History Narrative    Not on file     Social Determinants of Health     Financial Resource Strain: Low Risk     Difficulty of Paying Living Expenses: Not hard at all   Food Insecurity: No Food Insecurity    Worried About Running Out of Food in the Last Year: Never true    Ran Out of Food in the Last Year: Never true   Transportation Needs: Not on file   Physical Activity: Not on file   Stress: Not on file   Social Connections: Not on file   Intimate Partner Violence: Not on file   Housing Stability: Not on file       Visit Vitals  /77   Pulse 74   Temp 97.8 °F (36.6 °C) (Temporal)   Resp 16   Ht 5' 2\" (1.575 m)   Wt 237 lb (107.5 kg)   SpO2 96%   BMI 43.35 kg/m²     General:  Well appearing female no acute distress  HEENT:   PERRL,normal conjunctiva. External ear and canals normal, TMs normal.  Hearing normal to voice. Nose without edema or discharge, normal septum. Lips, teeth, gums normal.  Oropharynx: no erythema, no exudates, no lesions, normal tongue. Neck:  Supple. Thyroid normal size, nontender, without nodules. No carotid bruit. No masses or lymphadenopathy  Respiratory: no respiratory distress,  no wheezing, no rhonchi, no rales. No chest wall tenderness. Cardiovascular:  RRR, normal S1S2, no murmur. Gastrointestinal: normal bowel sounds, soft, nontender, without masses.   No hepatosplenomegaly. Extremities +2 pulses, no edema, normal sensation   Musculoskeletal:  Normal gait. Normal digits and nails. Normal strength and tone, no atrophy, and no abnormal movement. Skin:  No rash, no lesions, no ulcers. Skin warm, normal turgor, without induration or nodules. Neuro:  A and OX4, fluent speech, cranial nerves normal 2-12. Sensation normal to light touch. DTR symmetrical  Psych:  Normal affect      Lab Results   Component Value Date/Time    WBC 6.8 12/19/2019 02:35 PM    HGB 12.3 12/19/2019 02:35 PM    HCT 37.8 12/19/2019 02:35 PM    PLATELET 116 10/14/8300 02:35 PM    MCV 90 12/19/2019 02:35 PM     Lab Results   Component Value Date/Time    Sodium 143 12/19/2019 02:35 PM    Potassium 4.5 12/19/2019 02:35 PM    Chloride 107 (H) 12/19/2019 02:35 PM    CO2 25 12/19/2019 02:35 PM    Anion gap 9 08/26/2019 03:08 AM    Glucose 78 12/19/2019 02:35 PM    BUN 19 12/19/2019 02:35 PM    Creatinine 1.39 (H) 12/19/2019 02:35 PM    BUN/Creatinine ratio 14 12/19/2019 02:35 PM    GFR est AA 49 (L) 12/19/2019 02:35 PM    GFR est non-AA 43 (L) 12/19/2019 02:35 PM    Calcium 10.3 (H) 12/19/2019 02:35 PM     Lab Results   Component Value Date/Time    Cholesterol, total 196 08/09/2019 09:45 AM    HDL Cholesterol 34 (L) 08/09/2019 09:45 AM    LDL, calculated 148 (H) 08/09/2019 09:45 AM    VLDL, calculated 14 08/09/2019 09:45 AM    Triglyceride 70 08/09/2019 09:45 AM     Lab Results   Component Value Date/Time    TSH 0.973 08/09/2018 09:05 AM     Lab Results   Component Value Date/Time    Hemoglobin A1c 5.6 08/09/2019 09:45 AM     No results found for: Joelle Brown, XQVID3, XQVID, VD3RIA                Assessment and Plan:     1. Obesity due to excess calories without serious comorbidity, unspecified classification  Counseled on healthy diet and weight loss    2. Dyslipidemia  Counseled on lifestyle changes  - CBC WITH AUTOMATED DIFF; Future  - METABOLIC PANEL, COMPREHENSIVE;  Future  - LIPID PANEL; Future  - CBC WITH AUTOMATED DIFF  - METABOLIC PANEL, COMPREHENSIVE  - LIPID PANEL    3. Screening for diabetes mellitus  - HEMOGLOBIN A1C WITH EAG; Future  - HEMOGLOBIN A1C WITH EAG    4. Vitamin D deficiency  - VITAMIN D, 25 HYDROXY; Future  - VITAMIN D, 25 HYDROXY    5. Obesity, Class III, BMI 40-49.9 (morbid obesity) ([facility]  Counseled on lifestyle changes    6. Encounter for screening mammogram for malignant neoplasm of breast  - [facility] MAMMO BI SCREENING INCL CAD; Future    Follow-up and Dispositions    Return in about 3 months (around 1/3/2023) for pap smear.           Marielle Titus MD

## 2022-10-03 NOTE — PROGRESS NOTES
1. \"Have you been to the ER, urgent care clinic since your last visit? Hospitalized since your last visit? \" No    2. \"Have you seen or consulted any other health care providers outside of the [address] [place] [address] since your last visit? \" No     3. For patients aged 39-70: Has the patient had a colonoscopy / FIT/ Cologuard? Yes - no Care Gap present      If the patient is female:    4. For patients aged 41-77: Has the patient had a mammogram within the past 2 years? No      5. For patients aged 21-65: Has the patient had a pap smear? No    Records requested via EMR.    Signed By: Khushboo Babcock LPN     October 3, 4784

## 2022-10-04 LAB
25(OH)D3+25(OH)D2 SERPL-MCNC: 20.5 NG/ML (ref 30–100)
ALBUMIN SERPL-MCNC: 4.3 G/DL (ref 3.8–4.9)
ALBUMIN/GLOB SERPL: 1.7 {RATIO} (ref 1.2–2.2)
ALP SERPL-CCNC: 76 IU/L (ref 44–121)
ALT SERPL-CCNC: 23 IU/L (ref 0–32)
AST SERPL-CCNC: 17 IU/L (ref 0–40)
BASOPHILS # BLD AUTO: 0.1 X10E3/UL (ref 0–0.2)
BASOPHILS NFR BLD AUTO: 1 %
BILIRUB SERPL-MCNC: 0.4 MG/DL (ref 0–1.2)
BUN SERPL-MCNC: 15 MG/DL (ref 6–24)
BUN/CREAT SERPL: 14 (ref 9–23)
CALCIUM SERPL-MCNC: 9.4 MG/DL (ref 8.7–10.2)
CHLORIDE SERPL-SCNC: 107 MMOL/L (ref 96–106)
CHOLEST SERPL-MCNC: 184 MG/DL (ref 100–199)
CO2 SERPL-SCNC: 23 MMOL/L (ref 20–29)
CREAT SERPL-MCNC: 1.07 MG/DL (ref 0.57–1)
EGFR: 60 ML/MIN/1.73
EOSINOPHIL # BLD AUTO: 0.2 X10E3/UL (ref 0–0.4)
EOSINOPHIL NFR BLD AUTO: 3 %
ERYTHROCYTE [DISTWIDTH] IN BLOOD BY AUTOMATED COUNT: 12.7 % (ref 11.7–15.4)
EST. AVERAGE GLUCOSE BLD GHB EST-MCNC: 123 MG/DL
GLOBULIN SER CALC-MCNC: 2.5 G/DL (ref 1.5–4.5)
GLUCOSE SERPL-MCNC: 87 MG/DL (ref 70–99)
HBA1C MFR BLD: 5.9 % (ref 4.8–5.6)
HCT VFR BLD AUTO: 38.8 % (ref 34–46.6)
HDLC SERPL-MCNC: 34 MG/DL
HGB BLD-MCNC: 12.8 G/DL (ref 11.1–15.9)
IMM GRANULOCYTES # BLD AUTO: 0 X10E3/UL (ref 0–0.1)
IMM GRANULOCYTES NFR BLD AUTO: 0 %
LDLC SERPL CALC-MCNC: 131 MG/DL (ref 0–99)
LYMPHOCYTES # BLD AUTO: 3.1 X10E3/UL (ref 0.7–3.1)
LYMPHOCYTES NFR BLD AUTO: 44 %
MCH RBC QN AUTO: 29 PG (ref 26.6–33)
MCHC RBC AUTO-ENTMCNC: 33 G/DL (ref 31.5–35.7)
MCV RBC AUTO: 88 FL (ref 79–97)
MONOCYTES # BLD AUTO: 0.5 X10E3/UL (ref 0.1–0.9)
MONOCYTES NFR BLD AUTO: 7 %
NEUTROPHILS # BLD AUTO: 3.2 X10E3/UL (ref 1.4–7)
NEUTROPHILS NFR BLD AUTO: 45 %
PLATELET # BLD AUTO: 291 X10E3/UL (ref 150–450)
POTASSIUM SERPL-SCNC: 4.6 MMOL/L (ref 3.5–5.2)
PROT SERPL-MCNC: 6.8 G/DL (ref 6–8.5)
RBC # BLD AUTO: 4.41 X10E6/UL (ref 3.77–5.28)
SODIUM SERPL-SCNC: 142 MMOL/L (ref 134–144)
TRIGL SERPL-MCNC: 105 MG/DL (ref 0–149)
VLDLC SERPL CALC-MCNC: 19 MG/DL (ref 5–40)
WBC # BLD AUTO: 7 X10E3/UL (ref 3.4–10.8)

## 2022-10-04 NOTE — PROGRESS NOTES
Normal blood count  Kidney function has improved significantly ad now normal continue to avoid ibuprofen, aleve, motrin  Take only tylenol for pain  Blood sugar in pre diabetic range work on low sugar diet   Cholesterol is slightly better LDL bad cholesterol is mildly elevated work on low fat diet an regular exercise  Low vitamin D take 2000 units of vitamin D 3 daily OTC

## 2023-02-03 ENCOUNTER — TELEPHONE (OUTPATIENT)
Dept: INTERNAL MEDICINE CLINIC | Age: 58
End: 2023-02-03

## 2023-02-03 NOTE — TELEPHONE ENCOUNTER
----- Message from Ceci Ammy sent at 2/3/2023 10:40 AM EST -----  Subject: Appointment Request    Reason for Call: Established Patient Appointment needed: Routine Existing   Condition Follow Up    QUESTIONS    Reason for appointment request? Available appointments did not meet   patient need     Additional Information for Provider? Patient would like to reschedule he 3   month follow up with PAP.  Patient screened green   ---------------------------------------------------------------------------  --------------  [facility] INFO  [phone]; OK to leave message on voicemail  ---------------------------------------------------------------------------  --------------  SCRIPT ANSWERS  COVID Screen: Aristides Ying

## 2023-04-04 ENCOUNTER — OFFICE VISIT (OUTPATIENT)
Dept: INTERNAL MEDICINE CLINIC | Age: 58
End: 2023-04-04
Payer: COMMERCIAL

## 2023-04-04 PROCEDURE — 99214 OFFICE O/P EST MOD 30 MIN: CPT | Performed by: INTERNAL MEDICINE

## 2023-04-04 RX ORDER — ERGOCALCIFEROL 1.25 MG/1
50000 CAPSULE ORAL
Qty: 12 CAPSULE | Refills: 2 | Status: SHIPPED
Start: 2023-04-04

## 2023-04-04 NOTE — PROGRESS NOTES
Ms. Tyesha Goldsmith is presenting to follow up     CC:  Gyn Exam       HPI:  62 yo with a hx of obesity presenting for annual gyn exam    Had spotting February for few days   No period for 3 years   Not sexually active  No discharge no urinary complaints   Review of systems:  Constitutional: negative for fever, chills, weight loss, night sweats   Eyes : negative for vision changes, eye pain and discharge  Nose and Throat: negative for tinnitus, sore throat   Cardiovascular: negative for chest pain, palpitations and shortness of breath  Respiratory: negative for shortness of breath, cough and wheezing   Gastroinstestinal: negative for abdominal pain, nausea, vomiting, diarrhea, constipation, and blood in the stool  Musculoskeletal: negative for back ache and joint ache   Genitourinary: negative for dysuria, nocturia, polyuria and hematuria   Neurologic: Negative for focal weakness, numbness or incoordination  Skin: negative for rash, pruritus  Hematologic: negative for easy bruising      History reviewed. No pertinent past medical history. Past Surgical History:   Procedure Laterality Date    DELIVERY   12    HX TUBAL LIGATION         Allergies   Allergen Reactions    Shellfish Derived Hives       No current outpatient medications on file prior to visit. No current facility-administered medications on file prior to visit. family history includes Cancer in her maternal aunt; Diabetes in her maternal aunt; Hypertension in her maternal aunt and mother.     Social History     Socioeconomic History    Marital status: LEGALLY      Spouse name: Not on file    Number of children: Not on file    Years of education: Not on file    Highest education level: Not on file   Occupational History    Not on file   Tobacco Use    Smoking status: Former     Types: Cigarettes     Quit date: 2000     Years since quittin.2    Smokeless tobacco: Never   Vaping Use    Vaping Use: Never used Substance and Sexual Activity    Alcohol use: Yes     Comment: \"Rarely\"    Drug use: No    Sexual activity: Yes     Partners: Male   Other Topics Concern    Not on file   Social History Narrative    Not on file     Social Determinants of Health     Financial Resource Strain: Low Risk     Difficulty of Paying Living Expenses: Not hard at all   Food Insecurity: No Food Insecurity    Worried About Running Out of Food in the Last Year: Never true    Ran Out of Food in the Last Year: Never true   Transportation Needs: Not on file   Physical Activity: Not on file   Stress: Not on file   Social Connections: Not on file   Intimate Partner Violence: Not on file   Housing Stability: Not on file       Visit Vitals  /69 (BP 1 Location: Right upper arm, BP Patient Position: Sitting, BP Cuff Size: Large adult)   Pulse 85   Temp 97.4 °F (36.3 °C) (Temporal)   Resp 18   Ht 5' 2\" (1.575 m)   Wt 243 lb 6.4 oz (110.4 kg)   SpO2 97%   BMI 44.52 kg/m²     General:  Well appearing female no acute distress  HEENT:   PERRL,normal conjunctiva. External ear and canals normal, TMs normal.  Hearing normal to voice. Nose without edema or discharge, normal septum. Lips, teeth, gums normal.  Oropharynx: no erythema, no exudates, no lesions, normal tongue. Neck:  Supple. Thyroid normal size, nontender, without nodules. No carotid bruit. No masses or lymphadenopathy  Respiratory: no respiratory distress,  no wheezing, no rhonchi, no rales. No chest wall tenderness. Cardiovascular:  RRR, normal S1S2, no murmur. Gastrointestinal: normal bowel sounds, soft, nontender, without masses. No hepatosplenomegaly. Extremities +2 pulses, no edema, normal sensation   Musculoskeletal:  Normal gait. Normal digits and nails. Normal strength and tone, no atrophy, and no abnormal movement. Skin:  No rash, no lesions, no ulcers. Skin warm, normal turgor, without induration or nodules. Neuro:  A and OX4, fluent speech, cranial nerves normal 2-12. Sensation normal to light touch. DTR symmetrical  Psych:  Normal affect  Pelvic- normal appearing external genitalia, speculum normal appearing cervix, no abnormal discharge, pap done. Lab Results   Component Value Date/Time    WBC 7.0 10/03/2022 12:00 AM    HGB 12.8 10/03/2022 12:00 AM    HCT 38.8 10/03/2022 12:00 AM    PLATELET 586 21/66/7765 12:00 AM    MCV 88 10/03/2022 12:00 AM     Lab Results   Component Value Date/Time    Sodium 142 10/03/2022 12:00 AM    Potassium 4.6 10/03/2022 12:00 AM    Chloride 107 (H) 10/03/2022 12:00 AM    CO2 23 10/03/2022 12:00 AM    Anion gap 9 08/26/2019 03:08 AM    Glucose 87 10/03/2022 12:00 AM    BUN 15 10/03/2022 12:00 AM    Creatinine 1.07 (H) 10/03/2022 12:00 AM    BUN/Creatinine ratio 14 10/03/2022 12:00 AM    GFR est AA 49 (L) 12/19/2019 02:35 PM    GFR est non-AA 43 (L) 12/19/2019 02:35 PM    Calcium 9.4 10/03/2022 12:00 AM     Lab Results   Component Value Date/Time    Cholesterol, total 184 10/03/2022 12:00 AM    HDL Cholesterol 34 (L) 10/03/2022 12:00 AM    LDL, calculated 131 (H) 10/03/2022 12:00 AM    LDL, calculated 148 (H) 08/09/2019 09:45 AM    VLDL, calculated 19 10/03/2022 12:00 AM    VLDL, calculated 14 08/09/2019 09:45 AM    Triglyceride 105 10/03/2022 12:00 AM     Lab Results   Component Value Date/Time    TSH 0.973 08/09/2018 09:05 AM     Lab Results   Component Value Date/Time    Hemoglobin A1c 5.9 (H) 10/03/2022 12:00 AM     Lab Results   Component Value Date/Time    VITAMIN D, 25-HYDROXY 20.5 (L) 10/03/2022 12:00 AM                   Assessment and Plan:     1. Encounter for gynecological examination without abnormal finding  - PAP IG, APTIMA HPV AND RFX 16/18,45 (752000); Future  - PAP IG, APTIMA HPV AND RFX 16/18,45 (946148)    2. Vitamin D deficiency  - ergocalciferol (ERGOCALCIFEROL) 1,250 mcg (50,000 unit) capsule; Take 1 Capsule by mouth every seven (7) days. Dispense: 12 Capsule; Refill: 2    3.  Obesity, Class III, BMI 40-49.9 (morbid obesity) ([facility] [address] follow healthy diet exercise    4. Vaginal bleeding in postmenopausal  Spotting in February, - [address];  Gen Harris MD

## 2023-04-04 NOTE — PROGRESS NOTES
Chief Complaint   Patient presents with    Gyn Exam          1. \"Have you been to the ER, urgent care clinic since your last visit? Hospitalized since your last visit? \" No    2. \"Have you seen or consulted any other health care providers outside of the [address] [place] [address] since your last visit? \" No     3. For patients aged 39-70: Has the patient had a colonoscopy / FIT/ Cologuard? Yes - no Care Gap present      If the patient is female:    4. For patients aged 41-77: Has the patient had a mammogram within the past 2 years? No      5. For patients aged 21-65: Has the patient had a pap smear?  No

## 2023-10-05 ENCOUNTER — OFFICE VISIT (OUTPATIENT)
Age: 58
End: 2023-10-05
Payer: COMMERCIAL

## 2023-10-05 VITALS
WEIGHT: 245 LBS | DIASTOLIC BLOOD PRESSURE: 72 MMHG | SYSTOLIC BLOOD PRESSURE: 106 MMHG | RESPIRATION RATE: 16 BRPM | OXYGEN SATURATION: 97 % | HEART RATE: 76 BPM | TEMPERATURE: 97.5 F | HEIGHT: 62 IN | BODY MASS INDEX: 45.08 KG/M2

## 2023-10-05 DIAGNOSIS — R73.03 PRE-DIABETES: Primary | ICD-10-CM

## 2023-10-05 DIAGNOSIS — E66.01 MORBID (SEVERE) OBESITY DUE TO EXCESS CALORIES (HCC): ICD-10-CM

## 2023-10-05 DIAGNOSIS — E78.00 PURE HYPERCHOLESTEROLEMIA: ICD-10-CM

## 2023-10-05 DIAGNOSIS — N95.0 POSTMENOPAUSAL BLEEDING: ICD-10-CM

## 2023-10-05 PROCEDURE — 99214 OFFICE O/P EST MOD 30 MIN: CPT | Performed by: INTERNAL MEDICINE

## 2023-10-05 RX ORDER — ERGOCALCIFEROL 1.25 MG/1
50000 CAPSULE ORAL
COMMUNITY
Start: 2023-09-10

## 2023-10-05 SDOH — ECONOMIC STABILITY: HOUSING INSECURITY
IN THE LAST 12 MONTHS, WAS THERE A TIME WHEN YOU DID NOT HAVE A STEADY PLACE TO SLEEP OR SLEPT IN A SHELTER (INCLUDING NOW)?: NO

## 2023-10-05 SDOH — ECONOMIC STABILITY: FOOD INSECURITY: WITHIN THE PAST 12 MONTHS, THE FOOD YOU BOUGHT JUST DIDN'T LAST AND YOU DIDN'T HAVE MONEY TO GET MORE.: NEVER TRUE

## 2023-10-05 SDOH — ECONOMIC STABILITY: FOOD INSECURITY: WITHIN THE PAST 12 MONTHS, YOU WORRIED THAT YOUR FOOD WOULD RUN OUT BEFORE YOU GOT MONEY TO BUY MORE.: NEVER TRUE

## 2023-10-05 SDOH — ECONOMIC STABILITY: INCOME INSECURITY: HOW HARD IS IT FOR YOU TO PAY FOR THE VERY BASICS LIKE FOOD, HOUSING, MEDICAL CARE, AND HEATING?: NOT HARD AT ALL

## 2023-10-05 ASSESSMENT — PATIENT HEALTH QUESTIONNAIRE - PHQ9
SUM OF ALL RESPONSES TO PHQ QUESTIONS 1-9: 0
2. FEELING DOWN, DEPRESSED OR HOPELESS: 0
SUM OF ALL RESPONSES TO PHQ QUESTIONS 1-9: 0
SUM OF ALL RESPONSES TO PHQ9 QUESTIONS 1 & 2: 0
1. LITTLE INTEREST OR PLEASURE IN DOING THINGS: 0
SUM OF ALL RESPONSES TO PHQ QUESTIONS 1-9: 0
SUM OF ALL RESPONSES TO PHQ QUESTIONS 1-9: 0

## 2023-10-05 NOTE — PROGRESS NOTES
1. \"Have you been to the ER, urgent care clinic since your last visit? Hospitalized since your last visit? \" No     2. \"Have you seen or consulted any other health care providers outside of the [address] [place] [address] since your last visit? \" No      3. For patients aged 43-73: Has the patient had a colonoscopy / FIT/ Cologuard? Yes      If the patient is female:    4. For patients aged 43-66: Has the patient had a mammogram within the past 2 years? Yes       5. For patients aged 21-65: Has the patient had a pap smear?  Yes

## 2023-10-05 NOTE — PROGRESS NOTES
Ms. Xu Mackey is presenting to follow up     CC:  Follow-up (Vaginal spotting )       HPI:    Ms. Xu Mackey   is a 60 y.o. female with a hx of     Patient has intermittent vaginal spotting   US ordered   Enlarged heterogeneous fibroid uterus with a thickened endometrium  measuring 22 mm. Biopsy recommended given abnormal thickening in a  postmenopausal patient. Referral to gyn Dr Ana Higginbotham - and benign - need records  Still has spotting ( mild)     Has gained weigh up to 245   Admits to high sugar diet, dicussed need to loose weight    Colonoscopy  and normal due   Pap smear  normal  Mammogram    She is due for mammogram and Pap smear   declined shingles and flu shot   Review of systems:  Constitutional: negative for fever, chills, weight loss, night sweats   Eyes : negative for vision changes, eye pain and discharge  Nose and Throat: negative for tinnitus, sore throat   Cardiovascular: negative for chest pain, palpitations and shortness of breath  Respiratory: negative for shortness of breath, cough and wheezing   Gastroinstestinal: negative for abdominal pain, nausea, vomiting, diarrhea, constipation, and blood in the stool  Musculoskeletal: negative for back ache and joint ache   Genitourinary: negative for dysuria, nocturia, polyuria and hematuria   Neurologic: Negative for focal weakness, numbness or incoordination  Skin: negative for rash, pruritus  Hematologic: negative for easy bruising    10 systems reviewed and negative other then HPI     History reviewed. No pertinent past medical history.      Past Surgical History:   Procedure Laterality Date    DELIVERY       TUBAL LIGATION         Allergies   Allergen Reactions    Shellfish Allergy Hives       Current Outpatient Medications on File Prior to Visit   Medication Sig Dispense Refill    vitamin D (ERGOCALCIFEROL) 1.25 MG (98624 UT) CAPS capsule Take 1 capsule by mouth every 7 days       No current

## 2023-10-06 LAB
ALBUMIN SERPL-MCNC: 4.2 G/DL (ref 3.8–4.9)
ALBUMIN/GLOB SERPL: 1.6 {RATIO} (ref 1.2–2.2)
ALP SERPL-CCNC: 83 IU/L (ref 44–121)
ALT SERPL-CCNC: 26 IU/L (ref 0–32)
AST SERPL-CCNC: 23 IU/L (ref 0–40)
BASOPHILS # BLD AUTO: 0.1 X10E3/UL (ref 0–0.2)
BASOPHILS NFR BLD AUTO: 1 %
BILIRUB SERPL-MCNC: 0.3 MG/DL (ref 0–1.2)
BUN SERPL-MCNC: 17 MG/DL (ref 6–24)
BUN/CREAT SERPL: 14 (ref 9–23)
CALCIUM SERPL-MCNC: 10 MG/DL (ref 8.7–10.2)
CHLORIDE SERPL-SCNC: 107 MMOL/L (ref 96–106)
CHOLEST SERPL-MCNC: 208 MG/DL (ref 100–199)
CO2 SERPL-SCNC: 24 MMOL/L (ref 20–29)
CREAT SERPL-MCNC: 1.23 MG/DL (ref 0.57–1)
EGFRCR SERPLBLD CKD-EPI 2021: 51 ML/MIN/1.73
EOSINOPHIL # BLD AUTO: 0.2 X10E3/UL (ref 0–0.4)
EOSINOPHIL NFR BLD AUTO: 3 %
ERYTHROCYTE [DISTWIDTH] IN BLOOD BY AUTOMATED COUNT: 12.9 % (ref 11.7–15.4)
GLOBULIN SER CALC-MCNC: 2.6 G/DL (ref 1.5–4.5)
GLUCOSE SERPL-MCNC: 97 MG/DL (ref 70–99)
HCT VFR BLD AUTO: 40.7 % (ref 34–46.6)
HDLC SERPL-MCNC: 35 MG/DL
HGB BLD-MCNC: 13.3 G/DL (ref 11.1–15.9)
IMM GRANULOCYTES # BLD AUTO: 0 X10E3/UL (ref 0–0.1)
IMM GRANULOCYTES NFR BLD AUTO: 0 %
LDLC SERPL CALC-MCNC: 157 MG/DL (ref 0–99)
LYMPHOCYTES # BLD AUTO: 3.1 X10E3/UL (ref 0.7–3.1)
LYMPHOCYTES NFR BLD AUTO: 47 %
MCH RBC QN AUTO: 28.9 PG (ref 26.6–33)
MCHC RBC AUTO-ENTMCNC: 32.7 G/DL (ref 31.5–35.7)
MCV RBC AUTO: 89 FL (ref 79–97)
MONOCYTES # BLD AUTO: 0.7 X10E3/UL (ref 0.1–0.9)
MONOCYTES NFR BLD AUTO: 11 %
NEUTROPHILS # BLD AUTO: 2.5 X10E3/UL (ref 1.4–7)
NEUTROPHILS NFR BLD AUTO: 38 %
PLATELET # BLD AUTO: 308 X10E3/UL (ref 150–450)
POTASSIUM SERPL-SCNC: 4.8 MMOL/L (ref 3.5–5.2)
PROT SERPL-MCNC: 6.8 G/DL (ref 6–8.5)
RBC # BLD AUTO: 4.6 X10E6/UL (ref 3.77–5.28)
SODIUM SERPL-SCNC: 142 MMOL/L (ref 134–144)
TRIGL SERPL-MCNC: 86 MG/DL (ref 0–149)
VLDLC SERPL CALC-MCNC: 16 MG/DL (ref 5–40)
WBC # BLD AUTO: 6.5 X10E3/UL (ref 3.4–10.8)

## 2023-10-07 LAB — HBA1C MFR BLD: 6.2 % (ref 4.8–5.6)

## 2024-04-09 ENCOUNTER — OFFICE VISIT (OUTPATIENT)
Age: 59
End: 2024-04-09
Payer: COMMERCIAL

## 2024-04-09 VITALS
TEMPERATURE: 97.6 F | OXYGEN SATURATION: 98 % | WEIGHT: 239.6 LBS | HEART RATE: 72 BPM | HEIGHT: 62 IN | BODY MASS INDEX: 44.09 KG/M2 | DIASTOLIC BLOOD PRESSURE: 75 MMHG | SYSTOLIC BLOOD PRESSURE: 109 MMHG | RESPIRATION RATE: 18 BRPM

## 2024-04-09 DIAGNOSIS — E66.01 MORBID (SEVERE) OBESITY DUE TO EXCESS CALORIES (HCC): Primary | ICD-10-CM

## 2024-04-09 DIAGNOSIS — N18.31 STAGE 3A CHRONIC KIDNEY DISEASE (HCC): ICD-10-CM

## 2024-04-09 DIAGNOSIS — R73.03 PRE-DIABETES: ICD-10-CM

## 2024-04-09 DIAGNOSIS — Z23 ENCOUNTER FOR IMMUNIZATION: ICD-10-CM

## 2024-04-09 PROCEDURE — 90750 HZV VACC RECOMBINANT IM: CPT | Performed by: INTERNAL MEDICINE

## 2024-04-09 PROCEDURE — 99214 OFFICE O/P EST MOD 30 MIN: CPT | Performed by: INTERNAL MEDICINE

## 2024-04-09 PROCEDURE — 90471 IMMUNIZATION ADMIN: CPT | Performed by: INTERNAL MEDICINE

## 2024-04-09 ASSESSMENT — PATIENT HEALTH QUESTIONNAIRE - PHQ9
SUM OF ALL RESPONSES TO PHQ QUESTIONS 1-9: 0
2. FEELING DOWN, DEPRESSED OR HOPELESS: NOT AT ALL
SUM OF ALL RESPONSES TO PHQ QUESTIONS 1-9: 0
SUM OF ALL RESPONSES TO PHQ9 QUESTIONS 1 & 2: 0
1. LITTLE INTEREST OR PLEASURE IN DOING THINGS: NOT AT ALL
SUM OF ALL RESPONSES TO PHQ QUESTIONS 1-9: 0
SUM OF ALL RESPONSES TO PHQ QUESTIONS 1-9: 0

## 2024-04-09 NOTE — PROGRESS NOTES
Chief Complaint   Patient presents with    Follow-up     6 month f/up on Chronic conditions     \"Have you been to the ER, urgent care clinic since your last visit?  Hospitalized since your last visit?\"    NO    “Have you seen or consulted any other health care providers outside of [facility] since your last visit?”    NO

## 2024-04-09 NOTE — PROGRESS NOTES
Ms. Ashanti López is presenting to follow up     CC:  Follow-up (6 month f/up on Chronic conditions)       HPI:    Ms. Ashanti López   is a 58 y.o. female with a hx of kidney stones, HTN  and CKD stage III     Patient has intermittent vaginal spotting   US ordered   Enlarged heterogeneous fibroid uterus with a thickened endometrium  measuring 22 mm. Biopsy recommended given abnormal thickening in a  postmenopausal patient. Patient had biopsy with Dr Manzanares and benign  Referral to gyn Dr Manzanares - and benign - need records  Still has spotting ( mild)     Lost 7 lbs   Intentional dieting      blood pressure is well controlled not on medication.     CKD stage III : sees Dr Chase and doing well   Suspect due to kidney stones   Sees urologist    Colonoscopy  and normal due   Pap smear  normal  Mammogram     declined shingles and flu shot   Review of systems:  Constitutional: negative for fever, chills, weight loss, night sweats   10 systems reviewed and negative other then HPI     Past Medical History:   Diagnosis Date    Asthma         Past Surgical History:   Procedure Laterality Date    DELIVERY       TUBAL LIGATION         Allergies   Allergen Reactions    Shellfish Allergy Hives       Current Outpatient Medications on File Prior to Visit   Medication Sig Dispense Refill    Multiple Vitamin (VITAMIN LIQUID PO) Take by mouth      vitamin D (ERGOCALCIFEROL) 1.25 MG (79578 UT) CAPS capsule Take 1 capsule by mouth every 7 days (Patient not taking: Reported on 2024)       No current facility-administered medications on file prior to visit.       family history includes Cancer in her maternal aunt; Diabetes in her maternal aunt; High Blood Pressure in her mother; Hypertension in her maternal aunt and mother; Kidney Disease in her mother.    Social History     Socioeconomic History    Marital status: Legally      Spouse name: Not on file    Number of children: Not on file

## 2024-04-10 LAB
ALBUMIN SERPL-MCNC: 4 G/DL (ref 3.8–4.9)
ALBUMIN/GLOB SERPL: 1.7 {RATIO} (ref 1.2–2.2)
ALP SERPL-CCNC: 85 IU/L (ref 44–121)
ALT SERPL-CCNC: 22 IU/L (ref 0–32)
AST SERPL-CCNC: 20 IU/L (ref 0–40)
BILIRUB SERPL-MCNC: 0.2 MG/DL (ref 0–1.2)
BUN SERPL-MCNC: 16 MG/DL (ref 6–24)
BUN/CREAT SERPL: 16 (ref 9–23)
CALCIUM SERPL-MCNC: 9.5 MG/DL (ref 8.7–10.2)
CHLORIDE SERPL-SCNC: 108 MMOL/L (ref 96–106)
CO2 SERPL-SCNC: 24 MMOL/L (ref 20–29)
CREAT SERPL-MCNC: 0.98 MG/DL (ref 0.57–1)
EGFRCR SERPLBLD CKD-EPI 2021: 66 ML/MIN/1.73
GLOBULIN SER CALC-MCNC: 2.4 G/DL (ref 1.5–4.5)
GLUCOSE SERPL-MCNC: 96 MG/DL (ref 70–99)
HBA1C MFR BLD: 6 % (ref 4.8–5.6)
POTASSIUM SERPL-SCNC: 4.5 MMOL/L (ref 3.5–5.2)
PROT SERPL-MCNC: 6.4 G/DL (ref 6–8.5)
SODIUM SERPL-SCNC: 143 MMOL/L (ref 134–144)

## 2024-05-22 ENCOUNTER — HOSPITAL ENCOUNTER (OUTPATIENT)
Facility: HOSPITAL | Age: 59
Discharge: HOME | End: 2024-05-25
Payer: COMMERCIAL

## 2024-05-22 ENCOUNTER — TRANSCRIBE ORDERS (OUTPATIENT)
Facility: HOSPITAL | Age: 59
End: 2024-05-22

## 2024-05-22 DIAGNOSIS — Z12.31 OTHER SCREENING MAMMOGRAM: ICD-10-CM

## 2024-05-22 DIAGNOSIS — Z12.31 OTHER SCREENING MAMMOGRAM: Primary | ICD-10-CM

## 2024-05-22 PROCEDURE — 77067 SCR MAMMO BI INCL CAD: CPT

## 2024-06-06 ENCOUNTER — TELEMEDICINE (OUTPATIENT)
Age: 59
End: 2024-06-06
Payer: COMMERCIAL

## 2024-06-06 DIAGNOSIS — E66.01 CLASS 3 SEVERE OBESITY DUE TO EXCESS CALORIES WITH SERIOUS COMORBIDITY AND BODY MASS INDEX (BMI) OF 40.0 TO 44.9 IN ADULT (HCC): ICD-10-CM

## 2024-06-06 DIAGNOSIS — U07.1 COVID-19: Primary | ICD-10-CM

## 2024-06-06 PROCEDURE — 99214 OFFICE O/P EST MOD 30 MIN: CPT | Performed by: INTERNAL MEDICINE

## 2024-06-06 NOTE — PROGRESS NOTES
\"Have you been to the ER, urgent care clinic since your last visit?  Hospitalized since your last visit?\"    NO    “Have you seen or consulted any other health care providers outside of [facility] since your last visit?”    NO            Click Here for Release of Records Request

## 2024-06-06 NOTE — PROGRESS NOTES
Ashanti López, was evaluated through a synchronous (real-time) audio-video encounter. The patient (or guardian if applicable) is aware that this is a billable service, which includes applicable co-pays. This Virtual Visit was conducted with patient's (and/or legal guardian's) consent. Patient identification was verified, and a caregiver was present when appropriate.   The patient was located at Facility [facility] [address]  [address]  [place] [address]  Provider was located at Facility [facility]): [address]  [address]  [place] [address]  Confirm you are appropriately licensed, registered, or certified to deliver care in the state where the patient is located as indicated above. If you are not or unsure, please re-schedule the visit: Yes, I confirm.     Ashanti López (:  1965) is a Established patient, presenting virtually for evaluation of the following:    Assessment & Plan   Below is the assessment and plan developed based on review of pertinent history, physical exam, labs, studies, and medications.  1. COVID-19  -     nirmatrelvir/ritonavir 300/100 (PAXLOVID) 20 x 150 MG & 10 x 100MG TBPK; Take 3 tablets (two 150 mg nirmatrelvir and one 100 mg ritonavir tablets) by mouth every 12 hours for 5 days., Disp-30 tablet, R-0Normal  2. Class 3 severe obesity due to excess calories with serious comorbidity and body mass index (BMI) of 40.0 to 44.9 in adult ([facility])  Work note provided  No follow-ups on file.       Subjective   HPI    History of Present Illness  The patient presents via virtual visit for evaluation of COVID-19.    The patient tested positive for COVID-19 on Wednesday, confirmed by several of her coworkers. She initially experienced chills on Monday and general malaise. Currently, she is experiencing congestion, rhinorrhea, and pharyngitis. Although she previously had a sore throat, these symptoms have since resolved. Her current symptoms are not

## 2024-09-06 ENCOUNTER — TELEPHONE (OUTPATIENT)
Age: 59
End: 2024-09-06

## 2024-09-06 NOTE — TELEPHONE ENCOUNTER
----- Message from Eric ROMERO sent at 9/6/2024 10:23 AM EDT -----  Regarding: [facility] Appointment Request  [facility] Appointment Request    Patient needs appointment for [facility] Appointment Type: Existing Condition Follow Up  5 month follow up    Patient Requested Dates(s): as soon as possible  Patient Requested Time: anytime  Provider Name: patient's primary doctor    Reason for Appointment Request: Establish Patient - Available appointments did not meet patient need  --------------------------------------------------------------------------------------------------------------------------    Relationship to Patient: Self     Call Back Information: OK to leave message on voicemail  Preferred Call Back Number: [phone]

## 2024-09-30 ENCOUNTER — TELEPHONE (OUTPATIENT)
Age: 59
End: 2024-09-30

## 2024-09-30 NOTE — TELEPHONE ENCOUNTER
----- Message from Emmett SCHRADER sent at 9/30/2024 10:58 AM EDT -----  Regarding: E[facility] Appointment Request  E[facility] Appointment Request    Patient needs appointment for E[facility] Appointment Type: Existing Condition Follow Up.  5 months follow up    Patient Requested Dates(s): as soon as possible  Patient Requested Time: anytime  Provider Name: Celia Camilo MD    Reason for Appointment Request: Established Patient - Available appointments did not meet patient need  --------------------------------------------------------------------------------------------------------------------------    Relationship to Patient: Self     Call Back Information: OK to leave message on voicemail  Preferred Call Back Number: Phone [phone]

## 2024-10-23 SDOH — ECONOMIC STABILITY: FOOD INSECURITY: WITHIN THE PAST 12 MONTHS, THE FOOD YOU BOUGHT JUST DIDN'T LAST AND YOU DIDN'T HAVE MONEY TO GET MORE.: NEVER TRUE

## 2024-10-23 SDOH — ECONOMIC STABILITY: TRANSPORTATION INSECURITY
IN THE PAST 12 MONTHS, HAS LACK OF TRANSPORTATION KEPT YOU FROM MEETINGS, WORK, OR FROM GETTING THINGS NEEDED FOR DAILY LIVING?: NO

## 2024-10-23 SDOH — ECONOMIC STABILITY: FOOD INSECURITY: WITHIN THE PAST 12 MONTHS, YOU WORRIED THAT YOUR FOOD WOULD RUN OUT BEFORE YOU GOT MONEY TO BUY MORE.: NEVER TRUE

## 2024-10-23 SDOH — ECONOMIC STABILITY: INCOME INSECURITY: HOW HARD IS IT FOR YOU TO PAY FOR THE VERY BASICS LIKE FOOD, HOUSING, MEDICAL CARE, AND HEATING?: NOT HARD AT ALL

## 2024-10-25 ENCOUNTER — OFFICE VISIT (OUTPATIENT)
Age: 59
End: 2024-10-25
Payer: COMMERCIAL

## 2024-10-25 VITALS
OXYGEN SATURATION: 98 % | RESPIRATION RATE: 20 BRPM | HEART RATE: 82 BPM | SYSTOLIC BLOOD PRESSURE: 123 MMHG | TEMPERATURE: 97.8 F | WEIGHT: 230 LBS | BODY MASS INDEX: 42.33 KG/M2 | DIASTOLIC BLOOD PRESSURE: 82 MMHG | HEIGHT: 62 IN

## 2024-10-25 DIAGNOSIS — E78.00 PURE HYPERCHOLESTEROLEMIA: ICD-10-CM

## 2024-10-25 DIAGNOSIS — R73.03 PRE-DIABETES: Primary | ICD-10-CM

## 2024-10-25 DIAGNOSIS — R79.89 ELEVATED SERUM CREATININE: ICD-10-CM

## 2024-10-25 DIAGNOSIS — E66.01 MORBID (SEVERE) OBESITY DUE TO EXCESS CALORIES: ICD-10-CM

## 2024-10-25 DIAGNOSIS — E55.9 VITAMIN D DEFICIENCY: ICD-10-CM

## 2024-10-25 PROCEDURE — 99214 OFFICE O/P EST MOD 30 MIN: CPT | Performed by: INTERNAL MEDICINE

## 2024-10-25 NOTE — PROGRESS NOTES
Ashanti López (:  1965) is a 59 y.o. female, Established patient, here for evaluation of the following chief complaint(s):  Breast Problem (Pt inqurying about next steps since she has dense breast on mammogram)         Assessment & Plan  1. Prediabetes.  Her hemoglobin A1c is 6, indicating prediabetes. She has been making positive lifestyle changes, including cooking more at home and walking in the mornings, which have resulted in a 15-pound weight loss over the past year. Labs will be ordered to monitor her condition, including cholesterol, kidney, and liver function tests. She is advised to continue her current dietary and exercise regimen. If her blood sugar levels do not improve, further interventions will be considered.    2. Vitamin D deficiency.  She has been taking vitamin D as prescribed. A vitamin D level test will be performed to assess her current status and determine if any adjustments to her supplementation are needed.    3. Health Maintenance.  Her mammogram from May 2024 bening repeat in one year. She has declined the influenza vaccine. Her last colonoscopy was in , with the next one due in .        Results  Laboratory Studies  Hemoglobin A1c is 6.    Imaging  Mammogram done in May 2024 shows almost entirely fatty breast composition.  1. Pre-diabetes  -     Comprehensive Metabolic Panel; Future  -     Hemoglobin A1C; Future  2. Morbid (severe) obesity due to excess calories  -     Comprehensive Metabolic Panel; Future  3. Pure hypercholesterolemia  -     Comprehensive Metabolic Panel; Future  -     Lipid Panel; Future  4. Vitamin D deficiency  -     Vitamin D 25 Hydroxy; Future    Return in about 6 months (around 2025).       Subjective   History of Present Illness  The patient presents for a routine checkup.    She reports no current health issues. She has received a letter indicating dense breast tissue, which she is seeking clarification on. She has made dietary changes,

## 2024-10-26 LAB
25(OH)D3+25(OH)D2 SERPL-MCNC: 23.3 NG/ML (ref 30–100)
ALBUMIN SERPL-MCNC: 4.1 G/DL (ref 3.8–4.9)
ALP SERPL-CCNC: 83 IU/L (ref 44–121)
ALT SERPL-CCNC: 22 IU/L (ref 0–32)
AST SERPL-CCNC: 19 IU/L (ref 0–40)
BILIRUB SERPL-MCNC: 0.3 MG/DL (ref 0–1.2)
BUN SERPL-MCNC: 19 MG/DL (ref 8–27)
BUN/CREAT SERPL: 16 (ref 12–28)
CALCIUM SERPL-MCNC: 10 MG/DL (ref 8.7–10.3)
CHLORIDE SERPL-SCNC: 109 MMOL/L (ref 96–106)
CHOLEST SERPL-MCNC: 180 MG/DL (ref 100–199)
CO2 SERPL-SCNC: 24 MMOL/L (ref 20–29)
CREAT SERPL-MCNC: 1.18 MG/DL (ref 0.57–1)
EGFRCR SERPLBLD CKD-EPI 2021: 53 ML/MIN/1.73
GLOBULIN SER CALC-MCNC: 2.9 G/DL (ref 1.5–4.5)
GLUCOSE SERPL-MCNC: 95 MG/DL (ref 70–99)
HBA1C MFR BLD: 6 % (ref 4.8–5.6)
HDLC SERPL-MCNC: 35 MG/DL
LDLC SERPL CALC-MCNC: 130 MG/DL (ref 0–99)
POTASSIUM SERPL-SCNC: 4.7 MMOL/L (ref 3.5–5.2)
PROT SERPL-MCNC: 7 G/DL (ref 6–8.5)
SODIUM SERPL-SCNC: 147 MMOL/L (ref 134–144)
TRIGL SERPL-MCNC: 82 MG/DL (ref 0–149)
VLDLC SERPL CALC-MCNC: 15 MG/DL (ref 5–40)

## 2025-08-21 SDOH — ECONOMIC STABILITY: INCOME INSECURITY: IN THE LAST 12 MONTHS, WAS THERE A TIME WHEN YOU WERE NOT ABLE TO PAY THE MORTGAGE OR RENT ON TIME?: YES

## 2025-08-21 SDOH — ECONOMIC STABILITY: TRANSPORTATION INSECURITY
IN THE PAST 12 MONTHS, HAS THE LACK OF TRANSPORTATION KEPT YOU FROM MEDICAL APPOINTMENTS OR FROM GETTING MEDICATIONS?: NO

## 2025-08-22 ENCOUNTER — OFFICE VISIT (OUTPATIENT)
Age: 60
End: 2025-08-22
Payer: COMMERCIAL

## 2025-08-22 VITALS
OXYGEN SATURATION: 99 % | RESPIRATION RATE: 18 BRPM | HEART RATE: 74 BPM | BODY MASS INDEX: 42.07 KG/M2 | DIASTOLIC BLOOD PRESSURE: 80 MMHG | SYSTOLIC BLOOD PRESSURE: 110 MMHG | HEIGHT: 62 IN | WEIGHT: 228.6 LBS | TEMPERATURE: 98 F

## 2025-08-22 DIAGNOSIS — E66.01 MORBID (SEVERE) OBESITY DUE TO EXCESS CALORIES (HCC): ICD-10-CM

## 2025-08-22 DIAGNOSIS — R73.03 PRE-DIABETES: ICD-10-CM

## 2025-08-22 DIAGNOSIS — E78.00 PURE HYPERCHOLESTEROLEMIA: ICD-10-CM

## 2025-08-22 DIAGNOSIS — E55.9 VITAMIN D DEFICIENCY: Primary | ICD-10-CM

## 2025-08-22 DIAGNOSIS — E66.813 CLASS 3 SEVERE OBESITY DUE TO EXCESS CALORIES WITH SERIOUS COMORBIDITY AND BODY MASS INDEX (BMI) OF 40.0 TO 44.9 IN ADULT (HCC): ICD-10-CM

## 2025-08-22 DIAGNOSIS — N18.31 STAGE 3A CHRONIC KIDNEY DISEASE (HCC): ICD-10-CM

## 2025-08-22 PROCEDURE — 99214 OFFICE O/P EST MOD 30 MIN: CPT | Performed by: INTERNAL MEDICINE

## 2025-08-22 SDOH — ECONOMIC STABILITY: FOOD INSECURITY: WITHIN THE PAST 12 MONTHS, YOU WORRIED THAT YOUR FOOD WOULD RUN OUT BEFORE YOU GOT MONEY TO BUY MORE.: NEVER TRUE

## 2025-08-22 SDOH — ECONOMIC STABILITY: FOOD INSECURITY: WITHIN THE PAST 12 MONTHS, THE FOOD YOU BOUGHT JUST DIDN'T LAST AND YOU DIDN'T HAVE MONEY TO GET MORE.: NEVER TRUE

## 2025-08-22 ASSESSMENT — PATIENT HEALTH QUESTIONNAIRE - PHQ9
SUM OF ALL RESPONSES TO PHQ QUESTIONS 1-9: 0
SUM OF ALL RESPONSES TO PHQ QUESTIONS 1-9: 0
1. LITTLE INTEREST OR PLEASURE IN DOING THINGS: NOT AT ALL
SUM OF ALL RESPONSES TO PHQ QUESTIONS 1-9: 0
2. FEELING DOWN, DEPRESSED OR HOPELESS: NOT AT ALL
SUM OF ALL RESPONSES TO PHQ QUESTIONS 1-9: 0

## 2025-08-24 LAB
25(OH)D3 SERPL-MCNC: 25.5 NG/ML (ref 30–100)
ALBUMIN SERPL-MCNC: 3.9 G/DL (ref 3.5–5.2)
ANION GAP SERPL CALC-SCNC: 10 MMOL/L (ref 2–14)
APPEARANCE UR: CLEAR
BACTERIA URNS QL MICRO: NEGATIVE /HPF
BASOPHILS # BLD: 0.06 K/UL (ref 0–0.1)
BASOPHILS NFR BLD: 0.7 % (ref 0–1)
BILIRUB UR QL: NEGATIVE
BUN SERPL-MCNC: 19 MG/DL (ref 8–23)
BUN/CREAT SERPL: 16 (ref 12–20)
CALCIUM SERPL-MCNC: 10 MG/DL (ref 8.8–10.2)
CALCIUM SERPL-MCNC: 10.1 MG/DL (ref 8.8–10.2)
CHLORIDE SERPL-SCNC: 107 MMOL/L (ref 98–107)
CHOLEST SERPL-MCNC: 211 MG/DL (ref 0–200)
CO2 SERPL-SCNC: 25 MMOL/L (ref 20–29)
COLOR UR: NORMAL
CREAT SERPL-MCNC: 1.14 MG/DL (ref 0.6–1)
DIFFERENTIAL METHOD BLD: ABNORMAL
EOSINOPHIL # BLD: 0.12 K/UL (ref 0–0.4)
EOSINOPHIL NFR BLD: 1.5 % (ref 0–7)
EPITH CASTS URNS QL MICRO: NORMAL /LPF
ERYTHROCYTE [DISTWIDTH] IN BLOOD BY AUTOMATED COUNT: 13.7 % (ref 11.5–14.5)
EST. AVERAGE GLUCOSE BLD GHB EST-MCNC: 116 MG/DL
GLUCOSE SERPL-MCNC: 77 MG/DL (ref 65–100)
GLUCOSE UR STRIP.AUTO-MCNC: NEGATIVE MG/DL
HBA1C MFR BLD: 5.7 % (ref 4–5.6)
HCT VFR BLD AUTO: 39.7 % (ref 35–47)
HDLC SERPL-MCNC: 38 MG/DL (ref 40–60)
HDLC SERPL: 5.6 (ref 0–5)
HGB BLD-MCNC: 12.1 G/DL (ref 11.5–16)
HGB UR QL STRIP: NEGATIVE
IMM GRANULOCYTES # BLD AUTO: 0.02 K/UL (ref 0–0.04)
IMM GRANULOCYTES NFR BLD AUTO: 0.2 % (ref 0–0.5)
KETONES UR QL STRIP.AUTO: NEGATIVE MG/DL
LDLC SERPL CALC-MCNC: 154 MG/DL (ref 0–100)
LEUKOCYTE ESTERASE UR QL STRIP.AUTO: NEGATIVE
LYMPHOCYTES # BLD: 3.64 K/UL (ref 0.8–3.5)
LYMPHOCYTES NFR BLD: 45.2 % (ref 12–49)
MCH RBC QN AUTO: 26.6 PG (ref 26–34)
MCHC RBC AUTO-ENTMCNC: 30.5 G/DL (ref 30–36.5)
MCV RBC AUTO: 87.3 FL (ref 80–99)
MONOCYTES # BLD: 0.65 K/UL (ref 0–1)
MONOCYTES NFR BLD: 8.1 % (ref 5–13)
NEUTS SEG # BLD: 3.57 K/UL (ref 1.8–8)
NEUTS SEG NFR BLD: 44.3 % (ref 32–75)
NITRITE UR QL STRIP.AUTO: NEGATIVE
NRBC # BLD: 0 K/UL (ref 0–0.01)
NRBC BLD-RTO: 0 PER 100 WBC
PH UR STRIP: 7.5 (ref 5–8)
PHOSPHATE SERPL-MCNC: 2.7 MG/DL (ref 2.4–4.5)
PLATELET # BLD AUTO: 361 K/UL (ref 150–400)
PMV BLD AUTO: 10 FL (ref 8.9–12.9)
POTASSIUM SERPL-SCNC: 4.5 MMOL/L (ref 3.5–5.1)
PROT UR STRIP-MCNC: NEGATIVE MG/DL
PTH-INTACT SERPL-MCNC: 59.8 PG/ML (ref 15–65)
RBC # BLD AUTO: 4.55 M/UL (ref 3.8–5.2)
RBC #/AREA URNS HPF: NORMAL /HPF (ref 0–5)
SODIUM SERPL-SCNC: 142 MMOL/L (ref 136–145)
SP GR UR REFRACTOMETRY: 1.02 (ref 1–1.03)
TRIGL SERPL-MCNC: 97 MG/DL (ref 0–150)
URINE CULTURE IF INDICATED: NORMAL
UROBILINOGEN UR QL STRIP.AUTO: 1 EU/DL (ref 0.2–1)
VLDLC SERPL CALC-MCNC: 19 MG/DL
WBC # BLD AUTO: 8.1 K/UL (ref 3.6–11)
WBC URNS QL MICRO: NORMAL /HPF (ref 0–4)

## (undated) DEVICE — SOLUTION IRRIGATION H2O 0797305] ICU MEDICAL INC]

## (undated) DEVICE — STERILE POLYISOPRENE POWDER-FREE SURGICAL GLOVES WITH EMOLLIENT COATING: Brand: PROTEXIS

## (undated) DEVICE — OPEN-END URETERAL CATHETER: Brand: COOK

## (undated) DEVICE — TUBING, SUCTION, 1/4" X 12', STRAIGHT: Brand: MEDLINE

## (undated) DEVICE — STRAP,POSITIONING,KNEE/BODY,FOAM,4X60": Brand: MEDLINE

## (undated) DEVICE — BAG COLLECTION FLD OR-TBL NS --

## (undated) DEVICE — GOWN,SIRUS,FABRNF,XL,20/CS: Brand: MEDLINE

## (undated) DEVICE — INFECTION CONTROL KIT SYS

## (undated) DEVICE — CYSTO/BLADDER IRRIGATION SET, REGULATING CLAMP

## (undated) DEVICE — COVER LT HNDL PLAS RIG 1 PER PK

## (undated) DEVICE — JELLY,LUBE,STERILE,FLIP TOP,TUBE,4-OZ: Brand: MEDLINE

## (undated) DEVICE — SKIN MARKER FINE TIP WITH RULER: Brand: DEVON

## (undated) DEVICE — SYR 10ML LUER LOK 1/5ML GRAD --

## (undated) DEVICE — GDWIRE UROL STR 150CM FLX TP -- BX/5 SENSOR

## (undated) DEVICE — PACK,CYSTOSCOPY,PK III,SIRUS: Brand: MEDLINE